# Patient Record
Sex: FEMALE | Race: BLACK OR AFRICAN AMERICAN | Employment: STUDENT | URBAN - METROPOLITAN AREA
[De-identification: names, ages, dates, MRNs, and addresses within clinical notes are randomized per-mention and may not be internally consistent; named-entity substitution may affect disease eponyms.]

---

## 2022-05-23 ENCOUNTER — HOSPITAL ENCOUNTER (OUTPATIENT)
Dept: PHYSICAL THERAPY | Age: 22
Discharge: HOME | End: 2022-05-23
Payer: COMMERCIAL

## 2022-05-23 PROCEDURE — 97161 PT EVAL LOW COMPLEX 20 MIN: CPT | Performed by: PHYSICAL THERAPIST

## 2022-05-23 PROCEDURE — 97110 THERAPEUTIC EXERCISES: CPT | Performed by: PHYSICAL THERAPIST

## 2022-05-23 NOTE — PROGRESS NOTES
[address] [address] [address] [facility] [address] [facility], [address] [place] [address]  Ph: [phone]    Fax: [phone]    Initial Evaluation/Plan of Care/Statement of Necessity for Physical Therapy Services     Patient name: Krzysztof [place]    Date/Start of Care:2022  : 2000  [x]  Patient  Verified  Provider#: [id number]          Referral source: Kevin Gao MD    Return visit to MD: Yumi 15     Medical/Treatment Diagnosis: Pain in right knee [M25.561]  Sprain of anterior cruciate ligament of right knee, initial encounter [S83.437A]  Bucket-handle tear of lateral meniscus, current injury, right knee, initial encounter Amaury Cruz    Payor: BLUE CROSS / Plan: Gianfranco Rios [id number] PPO / Product Type: PPO /       Prior Hospitalization: see medical history     Comorbidities: none  Prior Level of Function: active and I,  Member of color guard for [employer]   Medications: Verified on Patient Summary List          Patient / Family readiness to learn indicated by: asking questions  Persons(s) to be included in education: patient (P) and family support person (FSP);list parent  Barriers to Learning/Limitations: None  Patient Self Reported Health Status: excellent  Rehabilitation Potential: excellent  Previous Treatment/Compliance: 1 session with [facility] for HEP pre-op  PMHx/Surgical Hx: no previous R LE injuries  Work Hx: chantal at [employer]:  Now home for summer, 2 story home   Barriers to progress: none  Motivation: excellent  Substance use: none reported  Cognition: A & O x  3  Onset Date: injury 2022, surgery 2022    SUBJECTIVE  Mechanism of Injury/History of Complaint: Patient is a 25year old female who injured her R knee 2022 while dancing in color guard training session at [employer]. Imaging confirmed R ACL and bucket-handle meniscal tear.   She underwent R meniscal repair and ACL reconstruction with quad tendon autograft on 5/5/2022 at [facility]. She reports compliance with HEP provided by MD, which includes heelslides, quad sets, hip abd SLR, gastroc-soleus towel stretch in long sit and is performing 2/day. Vandana Gray reports some difficulty sleeping, mostly due to the bulkiness of the brace. She is icing her knee several times per day. She takes aspirin per MD to prevent blood clots but no other medications. Her goal is to return to dancing. Area of pain: R anterior knee    Pain Descriptors:  Intermittent, dull ache   Pain Level (0-10 scale)  At rest: 2/10 With activity: 5-6/10   Worst: 7/10   Least: 0/10  Things that worsen pain: movement   Things that ease pain: ice and elevation  Objective/Functional Measures including ROM/MMT:   Physical Findings   Ortho:   Posture:  Sl. Forward head and rounded shoulders  Gait and Functional Mobility:  Amb with B axillary crutches NWB RLE with brace locked in extension. Noted full elbow extension with crutches in current position, so hand  was raised to provide slight elbow flexion with improved axillary comfort reported. Sit to stand NWB RLE I with UE A.  Bed mobility I.  WBS:  As per protocol, NWB until 3 weeks  Swelling: +3 cm measured at mid-patella, +2 cm measured at tib tub  Gross findings:  R quad atrophy apparent and measurable, -1.5 cm R measured 5 cm proximal to superior pole of patella; steri-strips intact without drainage  Specific joints: *normal values in ()  KNEE        AROM          PROM                       MMT   R L R L R L   Extension (0)             0  NT    Flexion (145) Supine 75      110 Prone 85 Prone 120 NT    Patellar Mobility:  No c/o pain all directions  Additional comments: RLE MMT deferred due to post-op status; LLE MMT WFL; demonstrates upper abdominal strength 5/5; decreased R VMO contraction visible with quad sets    Ampac Score:   54.86%  ASSESSMENT/Changes in Function: Vandana Gray is a 29-year-old female 2 1/2 weeks s/p R knee meniscal repair and ACL repair with patellar tendon autograft. She presents with swelling, decreased ROM, decreased strength, decreased balance, and gait deficits which are limiting her functional mobility. She will benefit from skilled PT to address her impairments (as per protocol from PEDRITO MORGAN MD) and improve her functional mobility. Problem List/Impairments: pain affecting function, decrease ROM, decrease strength, edema affecting function, impaired gait/ balance, decrease ADL/ functional abilitiies and decrease flexibility/ joint mobility  Treatment Plan may include any combination of the following: Therapeutic exercise, Therapeutic activities, Neuromuscular re-education, Physical agent/modality, Gait/balance training, Manual therapy, Patient education and Stair training  Patient/ Caregiver education and instruction: brace/ splint application, exercises and other (patient instructed to continue wearing brace locked in extension, NWB and sleeping in brace until next visit when she is at 3 week krzysztof and can be progressed per protocol. Frequency / Duration: Patient to be seen 2-3 times per week for 24-36 treatments. Certification Period: 5/23/2022-8/23/2022  Patient Goal (s): return to same activity level    [] Met [] Not met [] Partially met   Short Term Goals: To be accomplished in 12-18 treatments. 1.  Patient will demonstrate AROM R knee 0-90* without increased pain. 2.  Patient will report daily compliance of progressive HEP to supplement therapy. 3.  Patient will ambulate with proper heel - toe mechanics FWB R knee. Long Term Goals: To be accomplished in 24-36 treatments. 1. Patient will demonstrate AROM R knee = L.  2.  Patient will ambulate up/down stairs without HR with smooth, reciprocal pattern and good eccentric control. TODAY'S TREATMENT: EVALUATION completed.     Visit #: 1/24-36  In time:1:00   Out time:2:05  Total Treatment Time (min): 65   Total Timed Codes (min): 10    Pain Level (0-10 scale) pre treatment: 2-3/10   Pain Level (0-10 scale) post treatment: 2-3/10  Modality rationale: decrease edema, decrease inflammation and decrease pain to improve the patients ability to complete functional mobility with less pain. Min Type Additional Details    [] Estim: []UnAtt   []Att       []TENS instruct                  []IFC  []Premod   []NMES []w/US   []w/ice   []w/heat  Position:                                     Location:   10 [x]  Ice     []  Heat  []  Ice massage Position: long sit  Location: R knee    []  Traction: [] Cervical       []Lumbar                       []Intermittent   []Continuous                     Lbs:  []W/heat               []W/heat and Estim    []  Ultrasound: []Continuous   [] Pulsed at:                           []1MHz   []3MHz Location:  W/cm2:    [x] Skin assessment post-treatment:  [x]intact        []redness- no adverse reaction     []redness - adverse reaction:   10 min Therapeutic Exercise:  [x] See flow sheet :   Rationale: increase ROM and increase strength to improve the patients ability to improve functional mobility. With   [x] TE   [] TA   [] Neuro   [] SC   [] other: Patient Education: [x] Review HEP    [x] Progressed/Changed HEP based on:  Protocol   [] positioning   [] body mechanics   [] transfers   [x] heat/ice application    [] other: continue with brace locked in extension, NWB until next f/u appointment       [x]  Plan of care has been reviewed with PTA. The Plan of Care is based on information from the initial evaluation. Eileen Garcia, PT 5/23/2022   ________________________________________________________________________    I certify that the above Therapy Services are being furnished while the patient is under my care. I agree with the treatment plan and certify that this therapy is necessary.     Physician's Signature:_________________________________________________  Date:____________Time: ____________     David Williamson MD

## 2022-05-26 ENCOUNTER — HOSPITAL ENCOUNTER (OUTPATIENT)
Dept: PHYSICAL THERAPY | Age: 22
Discharge: HOME | End: 2022-05-26
Payer: COMMERCIAL

## 2022-05-26 PROCEDURE — 97116 GAIT TRAINING THERAPY: CPT

## 2022-05-26 PROCEDURE — 97110 THERAPEUTIC EXERCISES: CPT

## 2022-05-26 NOTE — PROGRESS NOTES
PT DAILY TREATMENT NOTE  [facility]     Patient Name: Dyllan Ralph  Date:2022  : 2000  [x]  Patient  Verified  Payor: BLUE SHIRA / Plan: Gianfranco Rios [id number] PPO / Product Type: PPO /    Treatment Area: Pain in right knee [M25.561]  Sprain of anterior cruciate ligament of right knee, initial encounter [P42.439W]       Next MD APPT: 6/15/22  In time: 1:05pm  Out time: 1:45pm  Total Treatment Time (min): 40  Visit #: 2    SUBJECTIVE  Pain Level (0-10 scale) pre treatment: 0      Pain Level (0-10 scale) post treatment: 0    Any medication changes, allergies to medications, adverse drug reactions, diagnosis change, or new procedure performed?:   [x] No    [] Yes (see summary sheet for update)    Subjective functional status/changes:   [] No changes reported  Pt states she is not having any pain or difficulty at this time. She is still wearing the brace at all times and NWB with the crutches. She is sleeping well, performing exercises as directed and icing the knee 2-3x/day. OBJECTIVE    30 min Therapeutic Exercise:  [x] See flow sheet :   Rationale: increase ROM and increase strength to improve the patients ability to move around without L knee pain     10 min Gait Training:  _100__ feet with __crutches_ device on level surfaces with _supervision__ level of assist.  Practiced TTWB with brace unlocked to 90 deg. Adjustment made to height of crutches and brace was adjusted. Rationale: increase ROM, increase strength, improve coordination and improve balance  to improve the patients ability to walk safely     With   [x] TE   [] TA   [] Neuro   [] SC   [] other: Patient Education: [x] Review HEP    [] Progressed/Changed HEP based on:   [] positioning   [] body mechanics   [] transfers   [x] Use of heat/ice     [] other:         Other Objective/Functional Measures: Initiated exercises in clinic and reviewed HEP. Active knee flexion 82 deg.      ASSESSMENT/Changes in Function:   Pt responded well to tx with no pain pre or post session. Pt was able to perform exercises with minimal cues. She was able to ambulate with brace unlocked and use of cruz crutches maintaining WB restrictions. Good quad contraction noted with quad sets. Good patella mobility. Patient will continue to benefit from skilled PT services to modify and progress therapeutic interventions, address functional mobility deficits, address ROM deficits, address strength deficits, analyze and address soft tissue restrictions and analyze and cue movement patterns to attain remaining goals. GOALS/Progress towards goals:  Patient Goal (s): return to same activity level    []? Met []? Not met []? Partially met     Short Term Goals: To be accomplished in 12-18 treatments. 1.  Patient will demonstrate AROM R knee 0-90* without increased pain. 2.  Patient will report daily compliance of progressive HEP to supplement therapy. 3.  Patient will ambulate with proper heel - toe mechanics FWB R knee. Long Term Goals: To be accomplished in 24-36 treatments. 1. Patient will demonstrate AROM R knee = L.  2.  Patient will ambulate up/down stairs without HR with smooth, reciprocal pattern and good eccentric control.     PLAN  [x]  Continue plan of care  [x]  Upgrade activities as tolerated      [x]  Update interventions per flow sheet       []  Discharge due to:  []  Other:    Ildefonso [facility], PT, DPT 5/26/2022

## 2022-06-01 ENCOUNTER — HOSPITAL ENCOUNTER (OUTPATIENT)
Dept: PHYSICAL THERAPY | Age: 22
Discharge: HOME | End: 2022-06-01
Payer: COMMERCIAL

## 2022-06-01 PROCEDURE — 97110 THERAPEUTIC EXERCISES: CPT

## 2022-06-01 NOTE — PROGRESS NOTES
PT DAILY TREATMENT NOTE  [facility]     Patient Name: Rodolfo Camejo  Date:2022  : 2000  [x]  Patient  Verified  Payor: BLUE CROSS / Plan: [facility] PPO / Product Type: PPO /    Treatment Area: Pain in right knee [M25.561]  Sprain of anterior cruciate ligament of right knee, initial encounter [S83.125A]  Bucket-handle tear of lateral meniscus, current injury, right knee, initial encounter [H69.706S]       Next MD APPT: 6/15/22  In time: 4:35pm  Out time: 5:10pm  Total Treatment Time (min): 35  Visit #: 3    SUBJECTIVE  Pain Level (0-10 scale) pre treatment: 0      Pain Level (0-10 scale) post treatment: 0    Any medication changes, allergies to medications, adverse drug reactions, diagnosis change, or new procedure performed?:   [x] No    [] Yes (see summary sheet for update)    Subjective functional status/changes:   [x] No changes reported   No new complaints. Pt still reporting no pain. Doing well walking with WB restrictions and feels comfortable sleeping without the brace. Feels like the swelling has gone down and is still icing daily. OBJECTIVE    35 min Therapeutic Exercise:  [x] See flow sheet :   Rationale: increase ROM and increase strength to improve the patients ability to move around without L knee pain      min Gait Training:  _100__ feet with __crutches_ device on level surfaces with _supervision__ level of assist.  Practiced TTWB with brace unlocked to 90 deg. Adjustment made to height of crutches and brace was adjusted. Rationale: increase ROM, increase strength, improve coordination and improve balance  to improve the patients ability to walk safely     With   [x] TE   [] TA   [] Neuro   [] SC   [] other: Patient Education: [x] Review HEP    [] Progressed/Changed HEP based on:   [] positioning   [] body mechanics   [] transfers   [x] Use of heat/ice     [] other:         Other Objective/Functional Measures: continued per protocol.    Knee AROM 3-99 deg without pain   Performed 4 way SLR without brace this session     ASSESSMENT/Changes in Function:   Pt was able to perform SLR without extensor lag or pain. Meeting ROM goals for time frame at this time. Pt is progressing well at this time with no complaints. Patient will continue to benefit from skilled PT services to modify and progress therapeutic interventions, address functional mobility deficits, address ROM deficits, address strength deficits, analyze and address soft tissue restrictions and analyze and cue movement patterns to attain remaining goals. GOALS/Progress towards goals:  Patient Goal (s): return to same activity level    []? Met []? Not met []? Partially met     Short Term Goals: To be accomplished in 12-18 treatments. 1.  Patient will demonstrate AROM R knee 0-90* without increased pain. [x] Met [] Not met [] Partially met 6/1  2. Patient will report daily compliance of progressive HEP to supplement therapy. [x] Met [] Not met [] Partially met  6/1  3. Patient will ambulate with proper heel - toe mechanics FWB R knee. Long Term Goals: To be accomplished in 24-36 treatments. 1. Patient will demonstrate AROM R knee = L.  2.  Patient will ambulate up/down stairs without HR with smooth, reciprocal pattern and good eccentric control.     PLAN  [x]  Continue plan of care  [x]  Upgrade activities as tolerated      [x]  Update interventions per flow sheet       []  Discharge due to:  []  Other:    Mykel Leahy, PT, DPT 6/1/2022

## 2022-06-03 ENCOUNTER — HOSPITAL ENCOUNTER (OUTPATIENT)
Dept: PHYSICAL THERAPY | Age: 22
Discharge: HOME | End: 2022-06-03
Payer: COMMERCIAL

## 2022-06-03 PROCEDURE — 97110 THERAPEUTIC EXERCISES: CPT

## 2022-06-03 NOTE — PROGRESS NOTES
PT DAILY TREATMENT NOTE  [facility]     Patient Name: Bryce Phillips  Date:6/3/2022  : 2000  [x]  Patient  Verified  Payor: BLUE CROSS / Plan: [facility] PPO / Product Type: PPO /    Treatment Area: Pain in right knee [M25.561]  Sprain of anterior cruciate ligament of right knee, initial encounter [S83.511A]  Bucket-handle tear of lateral meniscus, current injury, right knee, initial encounter [Q36.816E]       Next MD APPT: 6/15/22  In time: 11:30 am  Out time: 12:08pm  Total Treatment Time (min): 38  Visit #: 4    SUBJECTIVE  Pain Level (0-10 scale) pre treatment: 0/10      Pain Level (0-10 scale) post treatment: 0/10    Any medication changes, allergies to medications, adverse drug reactions, diagnosis change, or new procedure performed?:   [x] No    [] Yes (see summary sheet for update)    Subjective functional status/changes:   [x] No changes reported   Pt reports no changes since last visit. Sleeping is going well in addition to TTWB. Steri-strips have started to come off. OBJECTIVE    38 min Therapeutic Exercise:  [x] See flow sheet :   Rationale: increase ROM and increase strength to improve the patients ability to move around without L knee pain      min Gait Training:  _100__ feet with __crutches_ device on level surfaces with _supervision__ level of assist.  Practiced TTWB with brace unlocked to 90 deg. Adjustment made to height of crutches and brace was adjusted. Rationale: increase ROM, increase strength, improve coordination and improve balance  to improve the patients ability to walk safely     With   [x] TE   [] TA   [] Neuro   [] SC   [] other: Patient Education: [x] Review HEP    [] Progressed/Changed HEP based on:   [] positioning   [] body mechanics   [] transfers   [x] Use of heat/ice     [x] other: Scar tissue massage         Other Objective/Functional Measures: continued per protocol.    AROM Knee flexion to 100 degrees on last heel slide   Added in scar tissue massage to exposed scars  Added in 4 way ankle with TB     ASSESSMENT/Changes in Function:   Pt is progressing well with protocol exercises. Next session will progress into second phase of strengthening exercises. Added in 4 way ankle to keep foot extrinsic muscles strong and ready for weight bearing in next couple weeks. Pt is very compliant with protocol exercises at home so working to add newer exercises in that are appropriate with protocol in clinic. Potential to add in VMO focused exercises such as SLR with ER. Can add in 3 way side lying straight leg raise next session. Pt's scars appear to be healing well and responded well to scar tissue massage. Pt instructed to start moisturizing exposed scars and doing at home tissue massage. Patient will continue to benefit from skilled PT services to modify and progress therapeutic interventions, address functional mobility deficits, address ROM deficits, address strength deficits, analyze and address soft tissue restrictions and analyze and cue movement patterns to attain remaining goals. GOALS/Progress towards goals:  Patient Goal (s): return to same activity level    []? Met []? Not met []? Partially met     Short Term Goals: To be accomplished in 12-18 treatments. 1.  Patient will demonstrate AROM R knee 0-90* without increased pain. [x] Met [] Not met [] Partially met 6/1  2. Patient will report daily compliance of progressive HEP to supplement therapy. [x] Met [] Not met [] Partially met  6/1  3. Patient will ambulate with proper heel - toe mechanics FWB R knee. Long Term Goals: To be accomplished in 24-36 treatments. 1. Patient will demonstrate AROM R knee = L.  2.  Patient will ambulate up/down stairs without HR with smooth, reciprocal pattern and good eccentric control. PLAN  [x]  Continue plan of care  [x]  Upgrade activities as tolerated      [x]  Update interventions per flow sheet       []  Discharge due to:  [x]  Other:  Add in VMO and progress glute strengthening exercises appropriate with protocol      Patient was informed and agreed to allow student to observe and participate in medical care. Patient was seen by student and licensed therapist who is in agreement with the above documentation.      Lakshmi Steen, SPT 6/3/2022

## 2022-06-07 ENCOUNTER — HOSPITAL ENCOUNTER (OUTPATIENT)
Dept: PHYSICAL THERAPY | Age: 22
Discharge: HOME | End: 2022-06-07
Payer: COMMERCIAL

## 2022-06-07 PROCEDURE — 97110 THERAPEUTIC EXERCISES: CPT

## 2022-06-07 NOTE — PROGRESS NOTES
PT DAILY TREATMENT NOTE  [facility]     Patient Name: Jorge Castellanos  ALUK:  : 2000  [x]  Patient  Verified  Payor: BLUE CROSS / Plan: [facility] PPO / Product Type: PPO /    Treatment Area: Pain in right knee [M25.561]  Sprain of anterior cruciate ligament of right knee, initial encounter [S83.511A]  Bucket-handle tear of lateral meniscus, current injury, right knee, initial encounter [G08.937C]       Next MD APPT: 6/15/22  In time: 1057 am  Out time: 1135pm  Total Treatment Time (min): 38  Visit #: 5    SUBJECTIVE  Pain Level (0-10 scale) pre treatment: 0/10      Pain Level (0-10 scale) post treatment: 0/10    Any medication changes, allergies to medications, adverse drug reactions, diagnosis change, or new procedure performed?:   [x] No    [] Yes (see summary sheet for update)    Subjective functional status/changes:   [x] No changes reported   Pt reports no new changes, no complaints in pain and tolerated WB status well. States she has been completing HEP. Pt asking when she will be able to drive. OBJECTIVE    38 min Therapeutic Exercise:  [x] See flow sheet :   Rationale: increase ROM and increase strength to improve the patients ability to move around without L knee pain      min Gait Training:  _100__ feet with __crutches_ device on level surfaces with _supervision__ level of assist.  Practiced TTWB with brace unlocked to 90 deg. Adjustment made to height of crutches and brace was adjusted. Rationale: increase ROM, increase strength, improve coordination and improve balance  to improve the patients ability to walk safely     With   [x] TE   [] TA   [] Neuro   [] SC   [] other: Patient Education: [x] Review HEP    [] Progressed/Changed HEP based on:   [] positioning   [] body mechanics   [] transfers   [x] Use of heat/ice     [x] other: reviewed Scar tissue massage and patellar mob        Other Objective/Functional Measures: continued per protocol.    AROM Knee flexion to 105 degrees on last heel slide     ASSESSMENT/Changes in Function:   Pt tolerated session well. All exercises completed without pain. Pt able to complete SLR with only slight quad/extemsion lag, improved with cues for quad engagement. Added VMO SLR w/ good carryover. Added sidelying clamshell, cues for proper hip alignment with clamshell and SL hip abd, with good carryover. Pt AROM 2-105 after ROM exercises. Patient will continue to benefit from skilled PT services to modify and progress therapeutic interventions, address functional mobility deficits, address ROM deficits, address strength deficits, analyze and address soft tissue restrictions and analyze and cue movement patterns to attain remaining goals. GOALS/Progress towards goals:  Patient Goal (s): return to same activity level    []? Met []? Not met []? Partially met     Short Term Goals: To be accomplished in 12-18 treatments. 1.  Patient will demonstrate AROM R knee 0-90* without increased pain. [x] Met [] Not met [] Partially met 6/1  2. Patient will report daily compliance of progressive HEP to supplement therapy. [x] Met [] Not met [] Partially met  6/1  3. Patient will ambulate with proper heel - toe mechanics FWB R knee. Long Term Goals: To be accomplished in 24-36 treatments. 1. Patient will demonstrate AROM R knee = L.  2.  Patient will ambulate up/down stairs without HR with smooth, reciprocal pattern and good eccentric control. PLAN  [x]  Continue plan of care  [x]  Upgrade activities as tolerated      [x]  Update interventions per flow sheet       []  Discharge due to:  []  Other:     Patient was informed and agreed to allow student to observe and participate in medical care. Patient was seen by student and licensed therapist who is in agreement with the above documentation.      Yosi Escoto, PTA 6/7/2022

## 2022-06-10 ENCOUNTER — HOSPITAL ENCOUNTER (OUTPATIENT)
Dept: PHYSICAL THERAPY | Age: 22
Discharge: HOME | End: 2022-06-10
Payer: COMMERCIAL

## 2022-06-10 PROCEDURE — 97110 THERAPEUTIC EXERCISES: CPT

## 2022-06-10 NOTE — PROGRESS NOTES
PT DAILY TREATMENT NOTE  [facility]     Patient Name: Beau Fletcher  Date:6/10/2022  : 2000  [x]  Patient  Verified  Payor: BLUE CROSS / Plan: Gianfranco Rios [id number] PPO / Product Type: PPO /    Treatment Area: Pain in right knee [M25.561]  Sprain of anterior cruciate ligament of right knee, initial encounter [S83.511A]  Bucket-handle tear of lateral meniscus, current injury, right knee, initial encounter [Z66.150S]       Next MD APPT: 6/15/22  In time: 10:50 am  Out time: 11:41pm  Total Treatment Time (min): 50min  Visit #:     SUBJECTIVE  Pain Level (0-10 scale) pre treatment: 0/10  Pain Level (0-10 scale) post treatment: 0/10    Any medication changes, allergies to medications, adverse drug reactions, diagnosis change, or new procedure performed?:   [x] No    [] Yes (see summary sheet for update)    Subjective functional status/changes:   [x] No changes reported   Pt reports no pain and is seeing MD next week 6/15/22.       OBJECTIVE      Modality rationale: decrease edema, decrease inflammation and decrease pain to improve the patients ability to ambulate without knee pain   Min Type Additional Details       [] Estim: []Att   []Unatt    []TENS instruct                  []IFC  []Premod   []NMES                     []Other:  []w/US   []w/ice   []w/heat  Position:  Location:       []  Traction: [] Cervical       []Lumbar                       [] Prone          []Supine                       []Intermittent   []Continuous Lbs:  [] before manual  [] after manual  []w/heat    []  Ultrasound: []Continuous   [] Pulsed                       at: []1MHz   []3MHz Location:  W/cm2:    [] Paraffin         Location:   []w/heat   10 [x]  Ice     []  Heat  []  Ice massage Position:supine  Location:R knee    []  Laser  []  Other: Position:  Location:      []  Vasopneumatic Device Pressure:       [] lo [] med [] hi   Temperature:      [x] Skin assessment post-treatment:  [x]intact []redness- no adverse reaction    []redness - adverse reaction:       40 min Therapeutic Exercise:  [x] See flow sheet :   Rationale: increase ROM and increase strength to improve the patients ability to move around without L knee pain      min Gait Training:  _100__ feet with __crutches_ device on level surfaces with _supervision__ level of assist.  Practiced TTWB with brace unlocked to 90 deg. Adjustment made to height of crutches and brace was adjusted. Rationale: increase ROM, increase strength, improve coordination and improve balance  to improve the patients ability to walk safely     With   [x] TE   [] TA   [] Neuro   [] SC   [] other: Patient Education: [x] Review HEP    [] Progressed/Changed HEP based on:   [] positioning   [] body mechanics   [] transfers   [x] Use of heat/ice     [x] other: 5 weeks 6/10/22        Other Objective/Functional Measures: continued per protocol. AROM Knee flexion to 108 degrees    Added fitter, heel raises and mini squat per protocol    ASSESSMENT/Changes in Function:   Patient was able to tolerate new exercises without any pain. Increased exercises per PT and protocol. Patient needed cues to slow down and work on eccentric lowering. Discussed elevating R LE more at home with cp for swelling. Patient had increase swelling in  R LE today. Reporting no pain pre or post treatment session. Patient will continue to benefit from skilled PT services to modify and progress therapeutic interventions, address functional mobility deficits, address ROM deficits, address strength deficits, analyze and address soft tissue restrictions and analyze and cue movement patterns to attain remaining goals. GOALS/Progress towards goals:  Patient Goal (s): return to same activity level    []? Met []? Not met []? Partially met     Short Term Goals: To be accomplished in 12-18 treatments. 1.  Patient will demonstrate AROM R knee 0-90* without increased pain. [x] Met [] Not met [] Partially met 6/1  2. Patient will report daily compliance of progressive HEP to supplement therapy. [x] Met [] Not met [] Partially met  6/1  3. Patient will ambulate with proper heel - toe mechanics FWB R knee. Long Term Goals: To be accomplished in 24-36 treatments. 1. Patient will demonstrate AROM R knee = L.  2.  Patient will ambulate up/down stairs without HR with smooth, reciprocal pattern and good eccentric control. Treatment Plan may include any combination of the following: Therapeutic exercise, Therapeutic activities, Neuromuscular re-education, Physical agent/modality, Gait/balance training, Manual therapy, Patient education and Stair training    Frequency / Duration: Patient to be seen 2-3 times per week for 24-36 treatments.     Certification Period: 5/23/2022-8/23/2022    PLAN  [x]  Continue plan of care  [x]  Upgrade activities as tolerated      [x]  Update interventions per flow sheet       []  Discharge due to:  []  Other:      Lindsey Jensen, PTA 6/10/2022

## 2022-06-14 ENCOUNTER — HOSPITAL ENCOUNTER (OUTPATIENT)
Dept: PHYSICAL THERAPY | Age: 22
Discharge: HOME | End: 2022-06-14
Payer: COMMERCIAL

## 2022-06-14 PROCEDURE — 97110 THERAPEUTIC EXERCISES: CPT

## 2022-06-14 NOTE — PROGRESS NOTES
[address] [facility], [address] [place] [address]  Ph: [phone]    Fax: [phone]  Therapy Progress Report  Name: Malena Daniels  : 2000   MD: Olvin Cosby MD     Medical Diagnosis: Pain in right knee [M25.561]  Sprain of anterior cruciate ligament of right knee, initial encounter [S83.306A]  Bucket-handle tear of lateral meniscus, current injury, right knee, initial encounter Brent Carrington  Start of Care: 22    Visits from Start of Care: 7   Missed Visits: 0  Certification Period: 22- 22    Frequency/Duration: 2 times a week for 24-36 treatments   Summary of Care/Goals:    ASSESSMENT/Changes in Function:   Pt is progressing well at this time. She has completed 7 sessions and is progressing well per protocol. Good knee flexion ROM at this time, 110 degrees. Able to perform SLR without extensor lag and has been able to initiate closed chain strengthening without pain or difficulty. Continues to ambulate with TTWB and crutches but has initiated heel to toe mechanics with brace and crutches in clinic without difficulty or pain. Will continue to progress per protocol at this time. Patient will continue to benefit from skilled PT services to modify and progress therapeutic interventions, address functional mobility deficits, address ROM deficits, address strength deficits, analyze and address soft tissue restrictions and analyze and cue movement patterns to attain remaining goals. GOALS/Progress towards goals:  Patient Goal (s): return to same activity level    []? ? Met []? ? Not met [x]? ? Partially met  - improving      Short Term Goals: To be accomplished in 12-18 treatments. 1.  Patient will demonstrate AROM R knee 0-90* without increased pain. [x]? Met []? Not met []? Partially met   2.  Patient will report daily compliance of progressive HEP to supplement therapy. [x]? Met []? Not met []?  Partially met 6/1  3.  Patient will ambulate with proper heel - toe mechanics FWB R knee. [] Met [] Not met [] Partially met         Long Term Goals: To be accomplished in 24-36 treatments. 1. Patient will demonstrate AROM R knee = L. [] Met [] Not met [] Partially met    2.  Patient will ambulate up/down stairs without HR with smooth, reciprocal pattern and good eccentric control. [] Met [] Not met [] Partially met     [facility] Score: 59.19%   Recommendations: continue current POC   [x]  Plan of care has been reviewed with PTA. Debbi Caraballo, PT, DPT 6/14/2022     ________________________________________________________________________     Please retain this original for your records.

## 2022-06-14 NOTE — PROGRESS NOTES
PT DAILY TREATMENT NOTE  [facility]     Patient Name: Krzysztof Colorado  Date:2022  : 2000  [x]  Patient  Verified  Payor: BLUE CROSS / Plan: Gianfranco Rios [id number] PPO / Product Type: PPO /    Treatment Area: Pain in right knee [M25.561]  Sprain of anterior cruciate ligament of right knee, initial encounter [S83.511A]  Bucket-handle tear of lateral meniscus, current injury, right knee, initial encounter [J79.182M]         Next MD APPT: 6/15/22  In time: 10:52am  Out time: 11:30am  Total Treatment Time (min): 38  Visit #:     SUBJECTIVE  Pain Level (0-10 scale) pre treatment: 0/10  Pain Level (0-10 scale) post treatment: 0/10    Any medication changes, allergies to medications, adverse drug reactions, diagnosis change, or new procedure performed?:   [x] No    [] Yes (see summary sheet for update)    Subjective functional status/changes:   [x] No changes reported   No new complaints. Doing well with no pain in the R knee.          OBJECTIVE      Modality rationale: decrease edema, decrease inflammation and decrease pain to improve the patients ability to ambulate without knee pain   Min Type Additional Details       [] Estim: []Att   []Unatt    []TENS instruct                  []IFC  []Premod   []NMES                     []Other:  []w/US   []w/ice   []w/heat  Position:  Location:       []  Traction: [] Cervical       []Lumbar                       [] Prone          []Supine                       []Intermittent   []Continuous Lbs:  [] before manual  [] after manual  []w/heat    []  Ultrasound: []Continuous   [] Pulsed                       at: []1MHz   []3MHz Location:  W/cm2:    [] Paraffin         Location:   []w/heat    [x]  Ice     []  Heat  []  Ice massage Position:supine  Location:R knee    []  Laser  []  Other: Position:  Location:      []  Vasopneumatic Device Pressure:       [] lo [] med [] hi   Temperature:      [x] Skin assessment post-treatment:  [x]intact []redness- no adverse reaction    []redness - adverse reaction:       33 min Therapeutic Exercise:  [x] See flow sheet :   Rationale: increase ROM and increase strength to improve the patients ability to move around without L knee pain     5 min Gait Training:  Began practicing heel to toe walking with the crutches as pt will be 6 weeks on Thursday and continues to be pain free with good mechanics. Rationale: increase ROM, increase strength, improve coordination and improve balance  to improve the patients ability to walk safely     With   [x] TE   [] TA   [] Neuro   [] SC   [] other: Patient Education: [x] Review HEP    [] Progressed/Changed HEP based on:   [] positioning   [] body mechanics   [] transfers   [x] Use of heat/ice     [x] other: 5 weeks 6/10/22        Other Objective/Functional Measures: continued per protocol. AROM Knee flexion to 110 degrees  Added standing resisted terminal knee ext and theraband ankle. increased reps with all previous exercises. ASSESSMENT/Changes in Function:   Pt is progressing well at this time. She has completed 7 sessions and is progressing per protocol. Good knee flexion ROM at this time. Able to perform SLR without extensor lag and has been able to initiate closed chain strengthening without pain or difficulty. Will continue to progress per protocol at this time. Patient will continue to benefit from skilled PT services to modify and progress therapeutic interventions, address functional mobility deficits, address ROM deficits, address strength deficits, analyze and address soft tissue restrictions and analyze and cue movement patterns to attain remaining goals. GOALS/Progress towards goals:  Patient Goal (s): return to same activity level    []? Met []? Not met []? Partially met     Short Term Goals: To be accomplished in 12-18 treatments. 1.  Patient will demonstrate AROM R knee 0-90* without increased pain. [x] Met [] Not met [] Partially met 6/1  2.   Patient will report daily compliance of progressive HEP to supplement therapy. [x] Met [] Not met [] Partially met  6/1  3. Patient will ambulate with proper heel - toe mechanics FWB R knee. Long Term Goals: To be accomplished in 24-36 treatments. 1. Patient will demonstrate AROM R knee = L.  2.  Patient will ambulate up/down stairs without HR with smooth, reciprocal pattern and good eccentric control. Treatment Plan may include any combination of the following: Therapeutic exercise, Therapeutic activities, Neuromuscular re-education, Physical agent/modality, Gait/balance training, Manual therapy, Patient education and Stair training    Frequency / Duration: Patient to be seen 2-3 times per week for 24-36 treatments.     Certification Period: 5/23/2022-8/23/2022    PLAN  [x]  Continue plan of care  [x]  Upgrade activities as tolerated      [x]  Update interventions per flow sheet       []  Discharge due to:  [x]  Other: 6 weeks 6/16      Debbi Casanova Current, PT, DPT 6/14/2022

## 2022-06-17 ENCOUNTER — HOSPITAL ENCOUNTER (OUTPATIENT)
Dept: PHYSICAL THERAPY | Age: 22
Discharge: HOME | End: 2022-06-17
Payer: COMMERCIAL

## 2022-06-17 PROCEDURE — 97110 THERAPEUTIC EXERCISES: CPT

## 2022-06-17 NOTE — PROGRESS NOTES
PT DAILY TREATMENT NOTE  [facility]     Patient Name: Lesly Chowdary  Date:2022  : 2000  [x]  Patient  Verified  Payor: BLUE CROSS / Plan: [employer]anco [employer]ios [id number] PPO / Product Type: PPO /    Treatment Area: Pain in right knee [M25.561]  Sprain of anterior cruciate ligament of right knee, initial encounter [S83.511A]  Bucket-handle tear of lateral meniscus, current injury, right knee, initial encounter [T46.390K]         Next MD APPT: 6/15/22  In time: 10:50 am  Out time: 11:30 am  Total Treatment Time (min): 40 min  Visit #:     SUBJECTIVE  Pain Level (0-10 scale) pre treatment: 0/10  Pain Level (0-10 scale) post treatment: 0/10    Any medication changes, allergies to medications, adverse drug reactions, diagnosis change, or new procedure performed?:   [x] No    [] Yes (see summary sheet for update)    Subjective functional status/changes:   [x] No changes reported   Patient saw MD and pleased with progress so far. She is still wearing brace but down to 1 crutch. \"He said I can do 1 crutch and then no crutches . \"       OBJECTIVE      Modality rationale: decrease edema, decrease inflammation and decrease pain to improve the patients ability to ambulate without knee pain   Min Type Additional Details       [] Estim: []Att   []Unatt    []TENS instruct                  []IFC  []Premod   []NMES                     []Other:  []w/US   []w/ice   []w/heat  Position:  Location:       []  Traction: [] Cervical       []Lumbar                       [] Prone          []Supine                       []Intermittent   []Continuous Lbs:  [] before manual  [] after manual  []w/heat    []  Ultrasound: []Continuous   [] Pulsed                       at: []1MHz   []3MHz Location:  W/cm2:    [] Paraffin         Location:   []w/heat    []  Ice     []  Heat  []  Ice massage Position:supine  Location:R knee    []  Laser  []  Other: Position:  Location:      []  Vasopneumatic Device Pressure:       [] lo [] med [] hi Temperature:      [] Skin assessment post-treatment:  []intact []redness- no adverse reaction    []redness - adverse reaction:       40 min Therapeutic Exercise:  [x] See flow sheet :   Rationale: increase ROM and increase strength to improve the patients ability to move around without L knee pain      min Gait Training:  Began practicing heel to toe walking with the crutches as pt will be 6 weeks on Thursday and continues to be pain free with good mechanics. Rationale: increase ROM, increase strength, improve coordination and improve balance  to improve the patients ability to walk safely     With   [x] TE   [] TA   [] Neuro   [] SC   [] other: Patient Education: [x] Review HEP    [] Progressed/Changed HEP based on:   [] positioning   [] body mechanics   [] transfers   [x] Use of heat/ice     [x] other: 5 weeks 6/10/22        Other Objective/Functional Measures: continued per protocol. Added nustep, leg press and standing/closed chain exercises    ASSESSMENT/Changes in Function:   Patient came in with 1 crutch following MD appt. She is able to advance to closed chain activities . She tolerated new exercises without any pain or discomfort. She was a little apprehensive at trying new exercises at first.  Will continue to progress per protocol at this time. Patient will continue to benefit from skilled PT services to modify and progress therapeutic interventions, address functional mobility deficits, address ROM deficits, address strength deficits, analyze and address soft tissue restrictions and analyze and cue movement patterns to attain remaining goals. GOALS/Progress towards goals:  Patient Goal (s): return to same activity level    []? Met []? Not met []? Partially met     Short Term Goals: To be accomplished in 12-18 treatments. 1.  Patient will demonstrate AROM R knee 0-90* without increased pain. [x] Met [] Not met [] Partially met 6/1  2.   Patient will report daily compliance of progressive HEP to supplement therapy. [x] Met [] Not met [] Partially met  6/1  3. Patient will ambulate with proper heel - toe mechanics FWB R knee. Long Term Goals: To be accomplished in 24-36 treatments. 1. Patient will demonstrate AROM R knee = L.  2.  Patient will ambulate up/down stairs without HR with smooth, reciprocal pattern and good eccentric control. Treatment Plan may include any combination of the following: Therapeutic exercise, Therapeutic activities, Neuromuscular re-education, Physical agent/modality, Gait/balance training, Manual therapy, Patient education and Stair training    Frequency / Duration: Patient to be seen 2-3 times per week for 24-36 treatments.     Certification Period: 5/23/2022-8/23/2022    PLAN  [x]  Continue plan of care  [x]  Upgrade activities as tolerated      [x]  Update interventions per flow sheet       []  Discharge due to:  [x]  Other: 6 weeks 6/16      Magalys Skinner, PTA 6/17/2022

## 2022-06-21 ENCOUNTER — HOSPITAL ENCOUNTER (OUTPATIENT)
Dept: PHYSICAL THERAPY | Age: 22
Discharge: HOME | End: 2022-06-21
Payer: COMMERCIAL

## 2022-06-21 PROCEDURE — 97110 THERAPEUTIC EXERCISES: CPT

## 2022-06-21 NOTE — PROGRESS NOTES
PT DAILY TREATMENT NOTE  [facility]     Patient Name: Clair Labor  Date:2022  : 2000  [x]  Patient  Verified  Payor: BLUE CROSS / Plan: [facility] PPO / Product Type: PPO /    Treatment Area: Pain in right knee [M25.561]  Sprain of anterior cruciate ligament of right knee, initial encounter [S83.511A]  Bucket-handle tear of lateral meniscus, current injury, right knee, initial encounter [E45.431J]         Next MD APPT: 22  In time: 10:50am  Out time: 11:28am  Total Treatment Time (min):38  Visit #:     SUBJECTIVE  Pain Level (0-10 scale) pre treatment: 0/10  Pain Level (0-10 scale) post treatment: 0/10    Any medication changes, allergies to medications, adverse drug reactions, diagnosis change, or new procedure performed?:   [x] No    [] Yes (see summary sheet for update)    Subjective functional status/changes:   [] No changes reported   Pt states yesterday she started walking around without the crutch. Still using the knee brace. Pt states the more she walked without the crutch the more normal the leg felt. No issues currently. OBJECTIVE    38 min Therapeutic Exercise:  [x] See flow sheet :   Rationale: increase ROM and increase strength to improve the patients ability to move around without L knee pain      min Gait Training:  Began practicing heel to toe walking with the crutches as pt will be 6 weeks on Thursday and continues to be pain free with good mechanics. Rationale: increase ROM, increase strength, improve coordination and improve balance  to improve the patients ability to walk safely     With   [x] TE   [] TA   [] Neuro   [] SC   [] other: Patient Education: [x] Review HEP    [] Progressed/Changed HEP based on:   [] positioning   [] body mechanics   [] transfers   [x] Use of heat/ice     [x] other: 5 weeks 6/10/22        Other Objective/Functional Measures: continued per protocol - added slant board, 3 way SLR standing, and bridges. Increase weight on leg press and had pt perform phase 2 SLRs     ASSESSMENT/Changes in Function:   Pt ambulating with brace only this session, discontinued crutches yesterday. Smooth gait with no signs of instability. Pt is progressing very well at this time. Will continue to progress per protocol at this time. Patient will continue to benefit from skilled PT services to modify and progress therapeutic interventions, address functional mobility deficits, address ROM deficits, address strength deficits, analyze and address soft tissue restrictions and analyze and cue movement patterns to attain remaining goals. GOALS/Progress towards goals:  Patient Goal (s): return to same activity level    []? Met []? Not met []? Partially met     Short Term Goals: To be accomplished in 12-18 treatments. 1.  Patient will demonstrate AROM R knee 0-90* without increased pain. [x] Met [] Not met [] Partially met 6/1  2. Patient will report daily compliance of progressive HEP to supplement therapy. [x] Met [] Not met [] Partially met  6/1  3. Patient will ambulate with proper heel - toe mechanics FWB R knee. .[] Met [] Not met [] Partially met 6/21 FWB with brace     Long Term Goals: To be accomplished in 24-36 treatments. 1. Patient will demonstrate AROM R knee = L. [] Met [] Not met [] Partially met 6/21 progressing   2. Patient will ambulate up/down stairs without HR with smooth, reciprocal pattern and good eccentric control. Treatment Plan may include any combination of the following: Therapeutic exercise, Therapeutic activities, Neuromuscular re-education, Physical agent/modality, Gait/balance training, Manual therapy, Patient education and Stair training    Frequency / Duration: Patient to be seen 2-3 times per week for 24-36 treatments.     Certification Period: 5/23/2022-8/23/2022    PLAN  [x]  Continue plan of care  [x]  Upgrade activities as tolerated      [x]  Update interventions per flow sheet       [] Discharge due to:  [x]  Other: Will be 8 weeks on 6/30/22 - able to discontinue use of brace at that time       [address] JEAN, PT, DPT 6/21/2022

## 2022-06-24 ENCOUNTER — HOSPITAL ENCOUNTER (OUTPATIENT)
Dept: PHYSICAL THERAPY | Age: 22
Discharge: HOME | End: 2022-06-24
Payer: COMMERCIAL

## 2022-06-24 PROCEDURE — 97110 THERAPEUTIC EXERCISES: CPT

## 2022-06-24 NOTE — PROGRESS NOTES
PT DAILY TREATMENT NOTE  [facility]     Patient Name: Brittni Gallegos  Date:2022  : 2000  [x]  Patient  Verified  Payor: [employer]UE [employer] / Plan: [employer] [id number] PPO / Product Type: PPO /    Treatment Area: Pain in right knee [M25.561]  Sprain of anterior cruciate ligament of right knee, initial encounter [S83.511A]  Bucket-handle tear of lateral meniscus, current injury, right knee, initial encounter [L27.539C]         Next MD APPT: 22  In time: 10:47am    Out time: 10:34am  Total Treatment Time (min):47  Visit #:     SUBJECTIVE  Pain Level (0-10 scale) pre treatment: 0/10  Pain Level (0-10 scale) post treatment: 0/10    Any medication changes, allergies to medications, adverse drug reactions, diagnosis change, or new procedure performed?:   [x] No    [] Yes (see summary sheet for update)    Subjective functional status/changes:   [] No changes reported   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 Pt reports max pain 6/10 after working at the summer camp. Pt states she did not have issues sleeping that night and was a little sore the next morning but then it eased up. Discussed signs of overuse. No pain currently. OBJECTIVE    47 min Therapeutic Exercise:  [x] See flow sheet :   Rationale: increase ROM and increase strength to improve the patients ability to move around without L knee pain      min Gait Training:  Began practicing heel to toe walking with the crutches as pt will be 6 weeks on Thursday and continues to be pain free with good mechanics.      Rationale: increase ROM, increase strength, improve coordination and improve balance  to improve the patients ability to walk safely     With   [x] TE   [] TA   [] Neuro   [] SC   [] other: Patient Education: [x] Review HEP    [] Progressed/Changed HEP based on:   [] positioning   [] body mechanics   [] transfers   [x] Use of heat/ice     [x] other: 5 weeks 6/10/22        Other Objective/Functional Measures: added weights to 4 way SLR and used all bands on fitter. Increase time on NUSTEP. ASSESSMENT/Changes in Function:   Pt responded well to tx with no pain throughout session. Was able to perform increase weights/resistance without difficulty. Pt reporting some soreness in the knee following her initial day working at summer camp due to all of the walking but this only lasted a day. Reviewed signs of overuse and pt reported understanding. Will continue to progress per protocol at this time. Patient will continue to benefit from skilled PT services to modify and progress therapeutic interventions, address functional mobility deficits, address ROM deficits, address strength deficits, analyze and address soft tissue restrictions and analyze and cue movement patterns to attain remaining goals. GOALS/Progress towards goals:  Patient Goal (s): return to same activity level    []? Met []? Not met []? Partially met     Short Term Goals: To be accomplished in 12-18 treatments. 1.  Patient will demonstrate AROM R knee 0-90* without increased pain. [x] Met [] Not met [] Partially met 6/1  2. Patient will report daily compliance of progressive HEP to supplement therapy. [x] Met [] Not met [] Partially met  6/1  3. Patient will ambulate with proper heel - toe mechanics FWB R knee. .[] Met [] Not met [] Partially met 6/21 FWB with brace     Long Term Goals: To be accomplished in 24-36 treatments. 1. Patient will demonstrate AROM R knee = L. [] Met [] Not met [] Partially met 6/21 progressing   2. Patient will ambulate up/down stairs without HR with smooth, reciprocal pattern and good eccentric control.     Treatment Plan may include any combination of the following: Therapeutic exercise, Therapeutic activities, Neuromuscular re-education, Physical agent/modality, Gait/balance training, Manual therapy, Patient education and Stair training    Frequency / Duration: Patient to be seen 2-3 times per week for 24-36 treatments.     Certification Period: 5/23/2022-8/23/2022    PLAN  [x]  Continue plan of care  [x]  Upgrade activities as tolerated      [x]  Update interventions per flow sheet       []  Discharge due to:  [x]  Other: Will be 8 weeks on 6/30/22 - able to discontinue use of brace at that time       Debbi Zarco, PT, DPT 6/24/2022

## 2022-06-28 ENCOUNTER — HOSPITAL ENCOUNTER (OUTPATIENT)
Dept: PHYSICAL THERAPY | Age: 22
Discharge: HOME | End: 2022-06-28
Payer: COMMERCIAL

## 2022-06-28 PROCEDURE — 97110 THERAPEUTIC EXERCISES: CPT

## 2022-06-28 NOTE — PROGRESS NOTES
PT DAILY TREATMENT NOTE  [facility]     Patient Name: Jeremias Gutierrez  Date:2022  : 2000  [x]  Patient  Verified  Payor: BLUE CROSS / Plan: [employer] 5747 PPO / Product Type: PPO /    Treatment Area: Pain in right knee [M25.561]  Sprain of anterior cruciate ligament of right knee, initial encounter [S83.511A]  Bucket-handle tear of lateral meniscus, current injury, right knee, initial encounter [C24.476F]         Next MD APPT: 22  In time: 10:53am   Out time: 11:53am  Total Treatment Time (min):60  Visit #:     SUBJECTIVE  Pain Level (0-10 scale) pre treatment: 0/10  Pain Level (0-10 scale) post treatment: 0/10    Any medication changes, allergies to medications, adverse drug reactions, diagnosis change, or new procedure performed?:   [x] No    [] Yes (see summary sheet for update)    Subjective functional status/changes:   [] No changes reported   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 Pt reports max pain 6/10 after working at the summer camp. Pt states she did not have issues sleeping that night and was a little sore the next morning but then it eased up. Discussed signs of overuse. No pain currently.       OBJECTIVE  Modality rationale: decrease pain to improve the patients ability to perform daily activities without R knee pain   Min Type Additional Details       [] Estim: []Att   []Unatt    []TENS instruct                  []IFC  []Premod   []NMES                     []Other:  []w/US   []w/ice   []w/heat  Position:  Location:       []  Traction: [] Cervical       []Lumbar                       [] Prone          []Supine                       []Intermittent   []Continuous Lbs:  [] before manual  [] after manual  []w/heat    []  Ultrasound: []Continuous   [] Pulsed                       at: []1MHz   []3MHz Location:  W/cm2:    [] Paraffin         Location:   []w/heat   10 [x]  Ice     []  Heat  []  Ice massage Position: supine   Location: R knee     []  Laser  []  Other: Position:  Location:      []  Vasopneumatic Device Pressure:       [] lo [] med [] hi   Temperature:      [x] Skin assessment post-treatment:  [x]intact []redness- no adverse reaction    []redness - adverse reaction:       50 min Therapeutic Exercise:  [x] See flow sheet :   Rationale: increase ROM and increase strength to improve the patients ability to move around without L knee pain      min Gait Training:  Began practicing heel to toe walking with the crutches as pt will be 6 weeks on Thursday and continues to be pain free with good mechanics. Rationale: increase ROM, increase strength, improve coordination and improve balance  to improve the patients ability to walk safely     With   [] TE   [] TA   [] Neuro   [] SC   [] other: Patient Education: [] Review HEP    [] Progressed/Changed HEP based on:   [] positioning   [] body mechanics   [] transfers   [] Use of heat/ice     [x] other: walking progression without brace starting on level surface (in home) then progressing to uneven surface/outdoors as tolerated         Other Objective/Functional Measures:   Performed all exercises without brace this session. Added stationary bike. ASSESSMENT/Changes in Function:   Pt was able to perform all exercises and ambulate in gym without knee brace. No gait deviations, instability in knee or pain noted or reported. Pt is progressing well at this time. Will continue to progress per protocol. Patient will continue to benefit from skilled PT services to modify and progress therapeutic interventions, address functional mobility deficits, address ROM deficits, address strength deficits, analyze and address soft tissue restrictions and analyze and cue movement patterns to attain remaining goals. GOALS/Progress towards goals:  Patient Goal (s): return to same activity level    []? Met []? Not met []?  Partially met     Short Term Goals: To be accomplished in 12-18 treatments. 1.  Patient will demonstrate AROM R knee 0-90* without increased pain. [x] Met [] Not met [] Partially met 6/1  2. Patient will report daily compliance of progressive HEP to supplement therapy. [x] Met [] Not met [] Partially met  6/1  3. Patient will ambulate with proper heel - toe mechanics FWB R knee. .[] Met [] Not met [] Partially met 6/21 FWB with brace     Long Term Goals: To be accomplished in 24-36 treatments. 1. Patient will demonstrate AROM R knee = L. [] Met [] Not met [] Partially met 6/21 progressing   2. Patient will ambulate up/down stairs without HR with smooth, reciprocal pattern and good eccentric control. Treatment Plan may include any combination of the following: Therapeutic exercise, Therapeutic activities, Neuromuscular re-education, Physical agent/modality, Gait/balance training, Manual therapy, Patient education and Stair training    Frequency / Duration: Patient to be seen 2-3 times per week for 24-36 treatments.     Certification Period: 5/23/2022-8/23/2022    PLAN  [x]  Continue plan of care  [x]  Upgrade activities as tolerated      [x]  Update interventions per flow sheet       []  Discharge due to:  [x]  Other: Will be 8 weeks on 6/30/22 - able to discontinue use of brace at that time       Debbi Granados, PT, DPT 6/28/2022

## 2022-07-01 ENCOUNTER — HOSPITAL ENCOUNTER (OUTPATIENT)
Dept: PHYSICAL THERAPY | Age: 22
Discharge: HOME | End: 2022-07-01
Payer: COMMERCIAL

## 2022-07-01 PROCEDURE — 97110 THERAPEUTIC EXERCISES: CPT

## 2022-07-01 NOTE — PROGRESS NOTES
PT DAILY TREATMENT NOTE  [facility]     Patient Name: Marisela Bartlett  SKZB:  : 2000  [x]  Patient  Verified  Payor: BLUE CROSS / Plan: [facility] PPO / Product Type: PPO /    Treatment Area: Pain in right knee [M25.561]  Sprain of anterior cruciate ligament of right knee, initial encounter [S83.511A]  Bucket-handle tear of lateral meniscus, current injury, right knee, initial encounter [X78.549D]         Next MD APPT: 22  In time: 10:45am   Out time: 11:36am  Total Treatment Time (min):51 min  Visit #:     SUBJECTIVE  Pain Level (0-10 scale) pre treatment: 0/10  Pain Level (0-10 scale) post treatment: 0/10    Any medication changes, allergies to medications, adverse drug reactions, diagnosis change, or new procedure performed?:   [x] No    [] Yes (see summary sheet for update)    Subjective functional status/changes:   [] No changes reported   Patient came in with no pain. She did not report any soreness and reports ice at the end of session helps. \"I'm doing my regular activities at home with no difficulty. \"      OBJECTIVE  Modality rationale: decrease pain to improve the patients ability to perform daily activities without R knee pain   Min Type Additional Details       [] Estim: []Att   []Unatt    []TENS instruct                  []IFC  []Premod   []NMES                     []Other:  []w/US   []w/ice   []w/heat  Position:  Location:       []  Traction: [] Cervical       []Lumbar                       [] Prone          []Supine                       []Intermittent   []Continuous Lbs:  [] before manual  [] after manual  []w/heat    []  Ultrasound: []Continuous   [] Pulsed                       at: []1MHz   []3MHz Location:  W/cm2:    [] Paraffin         Location:   []w/heat   10 [x]  Ice     []  Heat  []  Ice massage Position: supine   Location: R knee     []  Laser  []  Other: Position:  Location:      []  Vasopneumatic Device Pressure:       [] lo [] med [] hi Temperature:      [x] Skin assessment post-treatment:  [x]intact []redness- no adverse reaction    []redness - adverse reaction:       41 min Therapeutic Exercise:  [x] See flow sheet :   Rationale: increase ROM and increase strength to improve the patients ability to move around without L knee pain      min Gait Training:  Began practicing heel to toe walking with the crutches as pt will be 6 weeks on Thursday and continues to be pain free with good mechanics. Rationale: increase ROM, increase strength, improve coordination and improve balance  to improve the patients ability to walk safely     With   [] TE   [] TA   [] Neuro   [] SC   [] other: Patient Education: [] Review HEP    [] Progressed/Changed HEP based on:   [] positioning   [] body mechanics   [] transfers   [] Use of heat/ice     [x] other: walking progression without brace starting on level surface (in home) then progressing to uneven surface/outdoors as tolerated         Other Objective/Functional Measures:   Performed all exercises without brace this session. Added mini tramp     ASSESSMENT/Changes in Function:   Pt was able to perform all exercises and ambulate in gym without knee brace. She was able to tolerate new exercise and increase in reps. She did have some fatigue following exercises. No problems with knee instability noted or reported. Will continue to progress per protocol. Patient will continue to benefit from skilled PT services to modify and progress therapeutic interventions, address functional mobility deficits, address ROM deficits, address strength deficits, analyze and address soft tissue restrictions and analyze and cue movement patterns to attain remaining goals. GOALS/Progress towards goals:  Patient Goal (s): return to same activity level    []? Met []? Not met []? Partially met     Short Term Goals: To be accomplished in 12-18 treatments.   1.  Patient will demonstrate AROM R knee 0-90* without increased pain. [x] Met [] Not met [] Partially met 6/1  2. Patient will report daily compliance of progressive HEP to supplement therapy. [x] Met [] Not met [] Partially met  6/1  3. Patient will ambulate with proper heel - toe mechanics FWB R knee. .[] Met [] Not met [] Partially met 6/21 FWB with brace     Long Term Goals: To be accomplished in 24-36 treatments. 1. Patient will demonstrate AROM R knee = L. [] Met [] Not met [] Partially met 6/21 progressing   2. Patient will ambulate up/down stairs without HR with smooth, reciprocal pattern and good eccentric control. Treatment Plan may include any combination of the following: Therapeutic exercise, Therapeutic activities, Neuromuscular re-education, Physical agent/modality, Gait/balance training, Manual therapy, Patient education and Stair training    Frequency / Duration: Patient to be seen 2-3 times per week for 24-36 treatments.     Certification Period: 5/23/2022-8/23/2022    PLAN  [x]  Continue plan of care  [x]  Upgrade activities as tolerated      [x]  Update interventions per flow sheet       []  Discharge due to:  []  Other:       Linden Crigler, PTA 7/1/2022

## 2022-07-05 ENCOUNTER — HOSPITAL ENCOUNTER (OUTPATIENT)
Dept: PHYSICAL THERAPY | Age: 22
Discharge: HOME | End: 2022-07-05
Payer: COMMERCIAL

## 2022-07-05 PROCEDURE — 97110 THERAPEUTIC EXERCISES: CPT

## 2022-07-05 NOTE — PROGRESS NOTES
PT DAILY TREATMENT NOTE  [facility]     Patient Name: Radha Washington  Date:2022  : 2000  [x]  Patient  Verified  Payor: BLUE CROSS / Plan: [facility] PPO / Product Type: PPO /    Treatment Area: Pain in right knee [M25.561]  Sprain of anterior cruciate ligament of right knee, initial encounter [S83.511A]  Bucket-handle tear of lateral meniscus, current injury, right knee, initial encounter [R93.260H]         Next MD APPT: 22  In time: 4pm   Out time: 445pm  Total Treatment Time (min): 45 min  Visit #:     SUBJECTIVE  Pain Level (0-10 scale) pre treatment: 0/10  Pain Level (0-10 scale) post treatment: 0/10    Any medication changes, allergies to medications, adverse drug reactions, diagnosis change, or new procedure performed?:   [x] No    [] Yes (see summary sheet for update)    Subjective functional status/changes:   [] No changes reported   Pt states that everything has been going good. She reports that she feels like her balance is still not great.     OBJECTIVE  Modality rationale: decrease pain to improve the patients ability to perform daily activities without R knee pain   Min Type Additional Details       [] Estim: []Att   []Unatt    []TENS instruct                  []IFC  []Premod   []NMES                     []Other:  []w/US   []w/ice   []w/heat  Position:  Location:       []  Traction: [] Cervical       []Lumbar                       [] Prone          []Supine                       []Intermittent   []Continuous Lbs:  [] before manual  [] after manual  []w/heat    []  Ultrasound: []Continuous   [] Pulsed                       at: []1MHz   []3MHz Location:  W/cm2:    [] Paraffin         Location:   []w/heat    []  Ice     []  Heat  []  Ice massage Position: supine   Location: R knee     []  Laser  []  Other: Position:  Location:      []  Vasopneumatic Device Pressure:       [] lo [] med [] hi   Temperature:      [] Skin assessment post-treatment:  []intact []redness- no adverse reaction    []redness - adverse reaction:       40 min Therapeutic Exercise:  [x] See flow sheet :   Rationale: increase ROM and increase strength to improve the patients ability to move around without L knee pain      min Gait Training:  Began practicing heel to toe walking with the crutches as pt will be 6 weeks on Thursday and continues to be pain free with good mechanics. Rationale: increase ROM, increase strength, improve coordination and improve balance  to improve the patients ability to walk safely     With   [] TE   [] TA   [] Neuro   [] SC   [] other: Patient Education: [] Review HEP    [] Progressed/Changed HEP based on:   [] positioning   [] body mechanics   [] transfers   [] Use of heat/ice     [x] other: walking progression without brace starting on level surface (in home) then progressing to uneven surface/outdoors as tolerated         Other Objective/Functional Measures:   Performed all exercises without brace this session. Added mini tramp     ASSESSMENT/Changes in Function:   Pt was able to perform all exercises and ambulate in gym without knee brace. She was able to tolerate new exercise and increase in reps. Will continue to progress per protocol. Patient will continue to benefit from skilled PT services to modify and progress therapeutic interventions, address functional mobility deficits, address ROM deficits, address strength deficits, analyze and address soft tissue restrictions and analyze and cue movement patterns to attain remaining goals. GOALS/Progress towards goals:  Patient Goal (s): return to same activity level    []? Met []? Not met []? Partially met     Short Term Goals: To be accomplished in 12-18 treatments. 1.  Patient will demonstrate AROM R knee 0-90* without increased pain. [x] Met [] Not met [] Partially met 6/1  2. Patient will report daily compliance of progressive HEP to supplement therapy.  [x] Met [] Not met [] Partially met 6/1  3. Patient will ambulate with proper heel - toe mechanics FWB R knee. .[] Met [] Not met [] Partially met 6/21 FWB with brace     Long Term Goals: To be accomplished in 24-36 treatments. 1. Patient will demonstrate AROM R knee = L. [] Met [] Not met [] Partially met 6/21 progressing   2. Patient will ambulate up/down stairs without HR with smooth, reciprocal pattern and good eccentric control. Treatment Plan may include any combination of the following: Therapeutic exercise, Therapeutic activities, Neuromuscular re-education, Physical agent/modality, Gait/balance training, Manual therapy, Patient education and Stair training    Frequency / Duration: Patient to be seen 2-3 times per week for 24-36 treatments.     Certification Period: 5/23/2022-8/23/2022    PLAN  [x]  Continue plan of care  [x]  Upgrade activities as tolerated      [x]  Update interventions per flow sheet       []  Discharge due to:  []  Other:       Huber Fuentes, PTA 7/5/2022

## 2022-07-08 ENCOUNTER — HOSPITAL ENCOUNTER (OUTPATIENT)
Dept: PHYSICAL THERAPY | Age: 22
Discharge: HOME | End: 2022-07-08
Payer: COMMERCIAL

## 2022-07-08 PROCEDURE — 97110 THERAPEUTIC EXERCISES: CPT

## 2022-07-08 NOTE — PROGRESS NOTES
PT DAILY TREATMENT NOTE  [facility]     Patient Name: Dennise Panchal  Date:2022  : 2000  [x]  Patient  Verified  Payor: BLUE CROSS / Plan: [facility] PPO / Product Type: PPO /    Treatment Area: Pain in right knee [M25.561]  Sprain of anterior cruciate ligament of right knee, initial encounter [S83.511A]  Bucket-handle tear of lateral meniscus, current injury, right knee, initial encounter [K56.834Y]         Next MD APPT: 22  In time: 9:50am   Out time: 10:35am  Total Treatment Time (min): 45 min  Visit #:     SUBJECTIVE  Pain Level (0-10 scale) pre treatment: 0/10  Pain Level (0-10 scale) post treatment: 0/10    Any medication changes, allergies to medications, adverse drug reactions, diagnosis change, or new procedure performed?:   [x] No    [] Yes (see summary sheet for update)    Subjective functional status/changes:   [x] No changes reported       OBJECTIVE  Declined modalities]   Modality rationale: decrease pain to improve the patients ability to perform daily activities without R knee pain   Min Type Additional Details       [] Estim: []Att   []Unatt    []TENS instruct                  []IFC  []Premod   []NMES                     []Other:  []w/US   []w/ice   []w/heat  Position:  Location:       []  Traction: [] Cervical       []Lumbar                       [] Prone          []Supine                       []Intermittent   []Continuous Lbs:  [] before manual  [] after manual  []w/heat    []  Ultrasound: []Continuous   [] Pulsed                       at: []1MHz   []3MHz Location:  W/cm2:    [] Paraffin         Location:   []w/heat    []  Ice     []  Heat  []  Ice massage Position: supine   Location: R knee     []  Laser  []  Other: Position:  Location:      []  Vasopneumatic Device Pressure:       [] lo [] med [] hi   Temperature:      [] Skin assessment post-treatment:  []intact []redness- no adverse reaction    []redness - adverse reaction:       45 min Therapeutic Exercise:  [x] See flow sheet :   Rationale: increase ROM and increase strength to improve the patients ability to move around without L knee pain      min Gait Training:  Began practicing heel to toe walking with the crutches as pt will be 6 weeks on Thursday and continues to be pain free with good mechanics. Rationale: increase ROM, increase strength, improve coordination and improve balance  to improve the patients ability to walk safely     With   [] TE   [] TA   [] Neuro   [] SC   [] other: Patient Education: [] Review HEP    [] Progressed/Changed HEP based on:   [] positioning   [] body mechanics   [] transfers   [] Use of heat/ice     [x] other: walking progression without brace starting on level surface (in home) then progressing to uneven surface/outdoors as tolerated         Other Objective/Functional Measures:   Knee flexion R 113, L 117  Added step ups and hamstring NK table, progressed to BOSU ball with ball toss into rebounder, progressed to wall squats and added TB to 4 way standing SLR. ASSESSMENT/Changes in Function:   Noted leg length discrepancy with R LE shorter than L. Pt states she has noticed feeling off balance. May consider heel lift if needed as pt progresses through protocol. She is progressing very well at this time. Nearing full knee flexion ROM and progressing closed chain activities without difficulty. Patient will continue to benefit from skilled PT services to modify and progress therapeutic interventions, address functional mobility deficits, address ROM deficits, address strength deficits, analyze and address soft tissue restrictions and analyze and cue movement patterns to attain remaining goals. GOALS/Progress towards goals:  Patient Goal (s): return to same activity level    []? Met []? Not met []? Partially met     Short Term Goals: To be accomplished in 12-18 treatments. 1.  Patient will demonstrate AROM R knee 0-90* without increased pain.  [x] Met [] Not met [] Partially met 6/1  2. Patient will report daily compliance of progressive HEP to supplement therapy. [x] Met [] Not met [] Partially met  6/1  3. Patient will ambulate with proper heel - toe mechanics FWB R knee. .[x] Met [] Not met [] Partially met 7/8     Long Term Goals: To be accomplished in 24-36 treatments. 1. Patient will demonstrate AROM R knee = L. [] Met [] Not met [x] Partially met 7/7 nearing full range   2. Patient will ambulate up/down stairs without HR with smooth, reciprocal pattern and good eccentric control. [] Met [] Not met [x] Partially met  7/8 progressing     Treatment Plan may include any combination of the following: Therapeutic exercise, Therapeutic activities, Neuromuscular re-education, Physical agent/modality, Gait/balance training, Manual therapy, Patient education and Stair training    Frequency / Duration: Patient to be seen 2-3 times per week for 24-36 treatments.     Certification Period: 5/23/2022-8/23/2022    PLAN  [x]  Continue plan of care  [x]  Upgrade activities as tolerated      [x]  Update interventions per flow sheet       []  Discharge due to:  [x]  Other:  Add lunges and continue to progress proprioception and closed chain activities      Debbi Zarco, PT, DPT 7/8/2022

## 2022-07-12 ENCOUNTER — HOSPITAL ENCOUNTER (OUTPATIENT)
Dept: PHYSICAL THERAPY | Age: 22
Discharge: HOME | End: 2022-07-12
Payer: COMMERCIAL

## 2022-07-12 PROCEDURE — 97110 THERAPEUTIC EXERCISES: CPT

## 2022-07-12 NOTE — PROGRESS NOTES
PT DAILY TREATMENT NOTE  [facility]     Patient Name: Rylan Wade  Date:2022  : 2000  [x]  Patient  Verified  Payor: BLUE SHIRA / Plan: [id number] PPO / Product Type: PPO /    Treatment Area: Pain in right knee [M25.561]  Sprain of anterior cruciate ligament of right knee, initial encounter [S83.511A]  Bucket-handle tear of lateral meniscus, current injury, right knee, initial encounter [O49.140U]         Next MD APPT: 22  In time: 10:50am   Out time:11:35am  Total Treatment Time (min): 45 min  Visit #: 15/24-36    SUBJECTIVE  Pain Level (0-10 scale) pre treatment: 0/10  Pain Level (0-10 scale) post treatment: 0/10    Any medication changes, allergies to medications, adverse drug reactions, diagnosis change, or new procedure performed?:   [x] No    [] Yes (see summary sheet for update)    Subjective functional status/changes:   [x] No changes reported   Patient stated she tried to go down the stairs at home and it wasn't too bad. \"Itdoesn't hurt but I worked it out today. \"    OBJECTIVE  Declined modalities]   Modality rationale: decrease pain to improve the patients ability to perform daily activities without R knee pain   Min Type Additional Details       [] Estim: []Att   []Unatt    []TENS instruct                  []IFC  []Premod   []NMES                     []Other:  []w/US   []w/ice   []w/heat  Position:  Location:       []  Traction: [] Cervical       []Lumbar                       [] Prone          []Supine                       []Intermittent   []Continuous Lbs:  [] before manual  [] after manual  []w/heat    []  Ultrasound: []Continuous   [] Pulsed                       at: []1MHz   []3MHz Location:  W/cm2:    [] Paraffin         Location:   []w/heat    []  Ice     []  Heat  []  Ice massage Position: supine   Location: R knee     []  Laser  []  Other: Position:  Location:      []  Vasopneumatic Device Pressure:       [] lo [] med [] hi   Temperature:      [] Skin assessment post-treatment:  []intact []redness- no adverse reaction    []redness - adverse reaction:       45 min Therapeutic Exercise:  [x] See flow sheet :   Rationale: increase ROM and increase strength to improve the patients ability to move around without L knee pain      min Gait Training:  Began practicing heel to toe walking with the crutches as pt will be 6 weeks on Thursday and continues to be pain free with good mechanics. Rationale: increase ROM, increase strength, improve coordination and improve balance  to improve the patients ability to walk safely     With   [] TE   [] TA   [] Neuro   [] SC   [] other: Patient Education: [] Review HEP    [] Progressed/Changed HEP based on:   [] positioning   [] body mechanics   [] transfers   [] Use of heat/ice     [x] other: walking progression without brace starting on level surface (in home) then progressing to uneven surface/outdoors as tolerated         Other Objective/Functional Measures:   Attempted 0HHA with step ups but patient could not tolerate. Did decrease HHA with other exercises no difficulty. STR/SHR on R LE  Added mini squats on Measur[employer] ball. Added Lunges in // bars with [facility] I Patient a little apprehensive and performed mini lunge  ncreased theraband to blue and weight on NK table for hams    ASSESSMENT/Changes in Function:   Patient progressing well with exercises and able to increase resistance on theraband. She was also able to tolerate new exercises with no complaints of pain but did have some apprehension. She was not able to [facility] to [facility] step [facility]. No pain post treatment session. Patient will continue to benefit from skilled PT services to modify and progress therapeutic interventions, address functional mobility deficits, address ROM deficits, address strength deficits, analyze and address soft tissue restrictions and analyze and cue movement patterns to attain remaining goals.         GOALS/Progress towards goals:  Patient Goal (s): return to same activity level    []? Met []? Not met []? Partially met     Short Term Goals: To be accomplished in 12-18 treatments. 1.  Patient will demonstrate AROM R knee 0-90* without increased pain. [x] Met [] Not met [] Partially met 6/1  2. Patient will report daily compliance of progressive HEP to supplement therapy. [x] Met [] Not met [] Partially met  6/1  3. Patient will ambulate with proper heel - toe mechanics FWB R knee. .[x] Met [] Not met [] Partially met 7/8     Long Term Goals: To be accomplished in 24-36 treatments. 1. Patient will demonstrate AROM R knee = L. [] Met [] Not met [x] Partially met 7/7 nearing full range   2. Patient will ambulate up/down stairs without HR with smooth, reciprocal pattern and good eccentric control. [] Met [] Not met [x] Partially met  7/8 progressing     Treatment Plan may include any combination of the following: Therapeutic exercise, Therapeutic activities, Neuromuscular re-education, Physical agent/modality, Gait/balance training, Manual therapy, Patient education and Stair training    Frequency / Duration: Patient to be seen 2-3 times per week for 24-36 treatments.     Certification Period: 5/23/2022-8/23/2022    PLAN  [x]  Continue plan of care  [x]  Upgrade activities as tolerated      [x]  Update interventions per flow sheet       []  Discharge due to:  []  Other:  Reassess next visit      Magalys Skinner, PTA 7/12/2022

## 2022-07-15 ENCOUNTER — HOSPITAL ENCOUNTER (OUTPATIENT)
Dept: PHYSICAL THERAPY | Age: 22
Discharge: HOME | End: 2022-07-15
Payer: COMMERCIAL

## 2022-07-15 PROCEDURE — 97110 THERAPEUTIC EXERCISES: CPT

## 2022-07-15 NOTE — PROGRESS NOTES
[address]  Ph: [phone]    Fax: [phone]  Therapy Progress Report  Name: Tressa Johnson  : 2000   MD: Brittany Cerna MD     Medical Diagnosis: Pain in right knee [M25.561]  Sprain of anterior cruciate ligament of right knee, initial encounter [A00.124O]  Bucket-handle tear of lateral meniscus, current injury, right knee, initial encounter Lilli Caery  Start of Care: 7/15/22 Visits from Start of Care: 16    Missed Visits: 0  Certification Period: 22 - 22    Frequency/Duration: 2 times a week for 24-36 treatments      Summary of Care: Pt is progressing with PT services. AROM/PROM now equal bilaterally. She demos normalized gait pattern without AD and no pain. She is tolerating closed chain exercises per protocol. Pt reports she is most limited with steps (reciprocal pattern) and performing mini-squats with equal weight shift. Patient will continue to benefit from skilled PT services to modify and progress therapeutic interventions, address functional mobility deficits, address ROM deficits, address strength deficits, analyze and address soft tissue restrictions and analyze and cue movement patterns to attain remaining goals. GOALS/Progress towards goals:  Patient Goal (s): return to same activity level    []? ? Met []? ? Not met []? ? Partially met   Short Term Goals: To be accomplished in 12-18 treatments. 1.  Patient will demonstrate AROM R knee 0-90* without increased pain. [x]? Met []? Not met []? Partially met   2.  Patient will report daily compliance of progressive HEP to supplement therapy. [x]? Met []? Not met []? Partially met    3.  Patient will ambulate with proper heel - toe mechanics FWB R knee.  .[x]? Met []? Not met []? Partially met    Long Term Goals: To be accomplished in -36 treatments. 1. Patient will demonstrate AROM R knee = L. [x]? Met []? Not met []? Partially met 7/15   2.  Patient will ambulate up/down stairs without HR with smooth, reciprocal pattern and good eccentric control. []? Met [x]? Not met []? Partially met  7/15 unable to do reciprocal pattern without handrail, difficulty    Knee                                 AROM                          PROM                           MMT    R L R L R L   Extension (0)      10 15 4 5   Flexion (145) 115 115 120   4+ 5   Additional comments: prone knee flexion 106 deg bilaterally     Hip                                AROM                            PROM                              MMT    R L R L R L   Flexion (120)         4 5   Extension (15)         4- 5   Abduction (40)         5 5   Adduction (30)         5 5   IR (40)               ER (40)               Additional comments: slight weakness R hip    Ampac Score:  35.72% (improved from 54%)  Recommendations: Continue PT per protocol. [x]  Plan of care has been reviewed with PTA. Jaylene Izaguirre, PT, DPT 7/15/2022     ________________________________________________________________________     Please retain this original for your records.

## 2022-07-15 NOTE — PROGRESS NOTES
PT DAILY TREATMENT NOTE  [facility]     Patient Name: Renu Fuentes  Date:7/15/2022  : 2000  [x]  Patient  Verified  Payor: BLUE SHIRA / Plan: [id number] PPO / Product Type: PPO /    Treatment Area: Pain in right knee [M25.561]  Sprain of anterior cruciate ligament of right knee, initial encounter [S83.511A]  Bucket-handle tear of lateral meniscus, current injury, right knee, initial encounter [S20.616N]         Next MD APPT: 22  In time: 100pm  Out time: 0148pm  Total Treatment Time (min): 48  Visit #:     SUBJECTIVE  Pain Level (0-10 scale) pre treatment: 0/10  Pain Level (0-10 scale) post treatment: 0/10    Any medication changes, allergies to medications, adverse drug reactions, diagnosis change, or new procedure performed?:   [x] No    [] Yes (see summary sheet for update)    Subjective functional status/changes:   [x] No changes reported   Pt reports total of 50% improvement. She has difficulty with stairs and weightbearing on the R side    OBJECTIVE    48 min Therapeutic Exercise:  [x] See flow sheet : reassessment performed   Rationale: increase ROM and increase strength to improve the patients ability to move around without L knee pain      min Gait Training:  Began practicing heel to toe walking with the crutches as pt will be 6 weeks on Thursday and continues to be pain free with good mechanics.      Rationale: increase ROM, increase strength, improve coordination and improve balance  to improve the patients ability to walk safely     With   [] TE   [] TA   [] Neuro   [] SC   [] other: Patient Education: [] Review HEP    [] Progressed/Changed HEP based on:   [] positioning   [] body mechanics   [] transfers   [] Use of heat/ice     [x] other: walking progression without brace starting on level surface (in home) then progressing to uneven surface/outdoors as tolerated         Other Objective/Functional Measures:     Knee          AROM          PROM MMT   R L R L R L   Extension (0)    10 15 4+ 5   Flexion (145) 115 115 120  5 5   Additional comments: prone knee flexion 106 deg bilaterally     Hip      AROM       PROM             MMT   R L R L R L   Flexion (120)     4 5   Extension (15)     4 5   Abduction (40)     5 5   Adduction (30)     5 5   IR (40)         ER (40)         Additional comments: slight weakness R hip      ASSESSMENT/Changes in Function:   Pt is progressing with PT services. AROM/PROM now equal bilaterally. She demos normalized gait pattern without AD and no pain. She is tolerating closed chain exercises per protocol. Pt reports she is most limited with steps (reciprocal pattern) and performing mini-squats with equal weight shift. Patient will continue to benefit from skilled PT services to modify and progress therapeutic interventions, address functional mobility deficits, address ROM deficits, address strength deficits, analyze and address soft tissue restrictions and analyze and cue movement patterns to attain remaining goals. GOALS/Progress towards goals:  Patient Goal (s): return to same activity level    []? Met []? Not met []? Partially met   Short Term Goals: To be accomplished in 12-18 treatments. 1.  Patient will demonstrate AROM R knee 0-90* without increased pain. [x] Met [] Not met [] Partially met 6/1  2. Patient will report daily compliance of progressive HEP to supplement therapy. [x] Met [] Not met [] Partially met  6/1  3. Patient will ambulate with proper heel - toe mechanics FWB R knee. .[x] Met [] Not met [] Partially met 7/8   Long Term Goals: To be accomplished in 24-36 treatments. 1. Patient will demonstrate AROM R knee = L. [x] Met [] Not met [] Partially met 7/15   2. Patient will ambulate up/down stairs without HR with smooth, reciprocal pattern and good eccentric control.  [] Met [x] Not met [] Partially met  7/15 unable to do reciprocal pattern without handrail, difficulty     Treatment Plan may include any combination of the following: Therapeutic exercise, Therapeutic activities, Neuromuscular re-education, Physical agent/modality, Gait/balance training, Manual therapy, Patient education and Stair training    Frequency / Duration: Patient to be seen 2-3 times per week for 24-36 treatments.     Certification Period: 5/23/2022-8/23/2022    PLAN  [x]  Continue plan of care  [x]  Upgrade activities as tolerated      [x]  Update interventions per flow sheet       []  Discharge due to:  []  Other:        Lurdes Helms, PT, DPT 7/15/2022

## 2022-07-19 ENCOUNTER — HOSPITAL ENCOUNTER (OUTPATIENT)
Dept: PHYSICAL THERAPY | Age: 22
Discharge: HOME | End: 2022-07-19
Payer: COMMERCIAL

## 2022-07-19 PROCEDURE — 97110 THERAPEUTIC EXERCISES: CPT

## 2022-07-19 NOTE — PROGRESS NOTES
PT DAILY TREATMENT NOTE  [facility]     Patient Name: Nanette Fernandez  Date:2022  : 2000  [x]  Patient  Verified  Payor: BLUE [employer]IRA / Plan: [id number] PPO / Product Type: PPO /    Treatment Area: Pain in right knee [M25.561]  Sprain of anterior cruciate ligament of right knee, initial encounter [S83.511A]  Bucket-handle tear of lateral meniscus, current injury, right knee, initial encounter [R00.236I]         Next MD APPT: 22  In time: 1:46pm  Out time: 2:36pm  Total Treatment Time (min): 50  Visit #:     SUBJECTIVE  Pain Level (0-10 scale) pre treatment: 0/10  Pain Level (0-10 scale) post treatment: 0/10    Any medication changes, allergies to medications, adverse drug reactions, diagnosis change, or new procedure performed?:   [x] No    [] Yes (see summary sheet for update)    Subjective functional status/changes:   [x] No changes reported   Pt states she feels like she is getting stronger. Gaining more confidence in the leg. OBJECTIVE    50 min Therapeutic Exercise:  [x] See flow sheet :   Rationale: increase ROM and increase strength to improve the patients ability to move around without L knee pain     With   [x] TE   [] TA   [] Neuro   [] SC   [] other: Patient Education: [x] Review HEP    [x] Progressed/Changed HEP based on: work on depth of lunge   [] positioning   [] body mechanics   [] transfers   [] Use of heat/ice     [] other:         Other Objective/Functional Measures: continued to progress closed chain activities. Added tandem stance with EO/EC and side lunges. Decreased height on step ups but also decreased UE support. Added reverse step up to the terminal knee ext. ASSESSMENT/Changes in Function:   Pt is progressing well at this time. Did well with proprioceptive exercises and was able to progress closed chain exercises. Pt with mild discomfort along the lateral scar during lunges but otherwise pain free. Continue per protocol.     Patient will continue to benefit from skilled PT services to modify and progress therapeutic interventions, address functional mobility deficits, address ROM deficits, address strength deficits, analyze and address soft tissue restrictions and analyze and cue movement patterns to attain remaining goals. GOALS/Progress towards goals:  Patient Goal (s): return to same activity level    []? Met []? Not met []? Partially met      Short Term Goals: To be accomplished in 12-18 treatments. 1.  Patient will demonstrate AROM R knee 0-90* without increased pain. [x] Met [] Not met [] Partially met 6/1  2. Patient will report daily compliance of progressive HEP to supplement therapy. [x] Met [] Not met [] Partially met  6/1  3. Patient will ambulate with proper heel - toe mechanics FWB R knee. .[x] Met [] Not met [] Partially met 7/8     Long Term Goals: To be accomplished in 24-36 treatments. 1. Patient will demonstrate AROM R knee = L. [x] Met [] Not met [] Partially met 7/15   2. Patient will ambulate up/down stairs without HR with smooth, reciprocal pattern and good eccentric control. [] Met [x] Not met [] Partially met  7/15 unable to do reciprocal pattern without handrail, difficulty     Treatment Plan may include any combination of the following: Therapeutic exercise, Therapeutic activities, Neuromuscular re-education, Physical agent/modality, Gait/balance training, Manual therapy, Patient education and Stair training    Frequency / Duration: Patient to be seen 2-3 times per week for 24-36 treatments.     Certification Period: 5/23/2022-8/23/2022    PLAN  [x]  Continue plan of care  [x]  Upgrade activities as tolerated      [x]  Update interventions per flow sheet       []  Discharge due to:  []  Other:        Ajay Liu, PT, DPT 7/19/2022

## 2022-07-22 ENCOUNTER — APPOINTMENT (OUTPATIENT)
Dept: PHYSICAL THERAPY | Age: 22
End: 2022-07-22
Payer: COMMERCIAL

## 2022-07-26 ENCOUNTER — APPOINTMENT (OUTPATIENT)
Dept: PHYSICAL THERAPY | Age: 22
End: 2022-07-26
Payer: COMMERCIAL

## 2022-07-29 ENCOUNTER — HOSPITAL ENCOUNTER (OUTPATIENT)
Dept: PHYSICAL THERAPY | Age: 22
Discharge: HOME | End: 2022-07-29
Payer: COMMERCIAL

## 2022-07-29 PROCEDURE — 97110 THERAPEUTIC EXERCISES: CPT

## 2022-07-29 NOTE — PROGRESS NOTES
PT DAILY TREATMENT NOTE  NON [facility]     Patient Name: Shannon Orlando  Date:2022  : 2000  [x]  Patient  Verified  Payor: BLUE CROSS / Plan: [employer]ios 5747 PPO / Product Type: PPO /    Treatment Area: Pain in right knee [M25.561]  Sprain of anterior cruciate ligament of right knee, initial encounter [S83.511A]  Bucket-handle tear of lateral meniscus, current injury, right knee, initial encounter [W90.111A]         Next MD APPT: 22  In time: 1355  Out time: 1440  Total Treatment Time (min): 45  Visit #:     SUBJECTIVE  Pain Level (0-10 scale) pre treatment: 0/10  Pain Level (0-10 scale) post treatment: 0/10    Any medication changes, allergies to medications, adverse drug reactions, diagnosis change, or new procedure performed?:   [x] No    [] Yes (see summary sheet for update)    Subjective functional status/changes:   [x] No changes reported   Pt reports she went to the Dr on Tuesday and was told she was on the right track and to focus on strengthening. The Dr also told her she could start running/jogging but the pt is hesitant     OBJECTIVE    45 min Therapeutic Exercise:  [x] See flow sheet :   Rationale: increase ROM and increase strength to improve the patients ability to move around without L knee pain     With   [x] TE   [] TA   [] Neuro   [] SC   [] other: Patient Education: [x] Review HEP    [x] Progressed/Changed HEP based on: work on depth of lunge   [] positioning   [] body mechanics   [] transfers   [] Use of heat/ice     [] other:         Other Objective/Functional Measures:  Continue with ex per flow sheet , tried gentle jog on mini tramp and pt reported \"it felt weird\"  no pain  Instructed pt to start walking gradually increasing distance and speed as tolerated before she tries to jog. ASSESSMENT/Changes in Function:     Pt is progressing well. Is somewhat reluctant to try jogging, not trusting the R knee. Mild jog on mini tramp was tolerated.     Patient will continue to benefit from skilled PT services to modify and progress therapeutic interventions, address functional mobility deficits, address ROM deficits, address strength deficits, analyze and address soft tissue restrictions and analyze and cue movement patterns to attain remaining goals. GOALS/Progress towards goals:  Patient Goal (s): return to same activity level    [] Met [] Not met [] Partially met      Short Term Goals: To be accomplished in 12-18 treatments. 1.  Patient will demonstrate AROM R knee 0-90* without increased pain. [x] Met [] Not met [] Partially met 6/1  2. Patient will report daily compliance of progressive HEP to supplement therapy. [x] Met [] Not met [] Partially met  6/1  3. Patient will ambulate with proper heel - toe mechanics FWB R knee. .[x] Met [] Not met [] Partially met 7/8     Long Term Goals: To be accomplished in 24-36 treatments. 1. Patient will demonstrate AROM R knee = L. [x] Met [] Not met [] Partially met 7/15   2. Patient will ambulate up/down stairs without HR with smooth, reciprocal pattern and good eccentric control. [] Met [x] Not met [] Partially met  7/15 unable to do reciprocal pattern without handrail, difficulty     Treatment Plan may include any combination of the following: Therapeutic exercise, Therapeutic activities, Neuromuscular re-education, Physical agent/modality, Gait/balance training, Manual therapy, Patient education and Stair training    Frequency / Duration: Patient to be seen 2-3 times per week for 24-36 treatments.     Certification Period: 5/23/2022-8/23/2022    PLAN  [x]  Continue plan of care  [x]  Upgrade activities as tolerated      [x]  Update interventions per flow sheet       []  Discharge due to:  []  Other:        Angel Miguel, PT 7/29/2022

## 2022-08-05 ENCOUNTER — HOSPITAL ENCOUNTER (OUTPATIENT)
Dept: PHYSICAL THERAPY | Age: 22
Discharge: HOME | End: 2022-08-05
Payer: COMMERCIAL

## 2022-08-05 PROCEDURE — 97110 THERAPEUTIC EXERCISES: CPT

## 2022-08-05 NOTE — PROGRESS NOTES
PT DAILY TREATMENT NOTE  [facility]     Patient Name: Timbo Mota  Date:2022  : 2000  [x]  Patient  Verified  Payor: BLUE SHIRA / Plan: [employer]ios 5747 PPO / Product Type: PPO /    Treatment Area: Pain in right knee [M25.561]  Sprain of anterior cruciate ligament of right knee, initial encounter [S83.511A]  Bucket-handle tear of lateral meniscus, current injury, right knee, initial encounter [H35.224Q]         Next MD APPT: 22  In time: 9:49 am  Out time: 10:35 am   Total Treatment Time (min): 46   Visit #:     SUBJECTIVE  Pain Level (0-10 scale) pre treatment: 0/10  Pain Level (0-10 scale) post treatment: 0/10    Any medication changes, allergies to medications, adverse drug reactions, diagnosis change, or new procedure performed?:   [x] No    [] Yes (see summary sheet for update)    Subjective functional status/changes:   [x] No changes reported   Pt reports no major changes since last visit. Expressed desire to start going on walks. Is still hesitant to try jogging but stated she would try jumping next session. Pt will have one more visit before returning to school and will be working with  there. Will see doctor again at 6 months post op. OBJECTIVE    46 min Therapeutic Exercise:  [x] See flow sheet :   Rationale: increase ROM and increase strength to improve the patients ability to move around without L knee pain     With   [x] TE   [] TA   [] Neuro   [] SC   [] other: Patient Education: [x] Review HEP    [x] Progressed/Changed HEP based on: work on depth of lunge   [] positioning   [] body mechanics   [] transfers   [] Use of heat/ice     [x] other: Encouraged patient to start going on walks         Other Objective/Functional Measures:     Added 9 minutes on treadmill at 1.9 mph   Increased weight with leg press   Added bouncing on trampoline to progress to jumping, SL Squat with UE support, and SL balance with foam and SL [employer] toss    ASSESSMENT/Changes in Function:   Pt was able to progress both strengthening and proprioception exercises. Will check in on post session soreness next time. Pt is willing to try new/more advanced exercises but does show some hesitation and does not totally trust R knee stability. Has difficulty with lunge form. Needed cues to let back heel rise in order to increase depth. Still limited depth on DL squat. Now able to squat half of BW on leg press. Continue to focus on SL strength, calf endurance, and dynamic balance in order to allow for progression back to sport. Will start to work on bilateral hopping on [employer] next time. Instructed patient to keep working on lunges at home to improve form, strengthen both LE's, and decrease reliance on UE support. Encouraged pt to start going on walks at a quicker walking pace. Patient will continue to benefit from skilled PT services to modify and progress therapeutic interventions, address functional mobility deficits, address ROM deficits, address strength deficits, analyze and address soft tissue restrictions and analyze and cue movement patterns to attain remaining goals. GOALS/Progress towards goals:  Patient Goal (s): return to same activity level    [] Met [] Not met [] Partially met      Short Term Goals: To be accomplished in 12-18 treatments. 1.  Patient will demonstrate AROM R knee 0-90* without increased pain. [x] Met [] Not met [] Partially met 6/1  2. Patient will report daily compliance of progressive HEP to supplement therapy. [x] Met [] Not met [] Partially met  6/1  3. Patient will ambulate with proper heel - toe mechanics FWB R knee. .[x] Met [] Not met [] Partially met 7/8     Long Term Goals: To be accomplished in 24-36 treatments. 1. Patient will demonstrate AROM R knee = L. [x] Met [] Not met [] Partially met 7/15   2. Patient will ambulate up/down stairs without HR with smooth, reciprocal pattern and good eccentric control.  [] Met [x] Not met [] Partially met  7/15 unable to do reciprocal pattern without handrail, difficulty     Treatment Plan may include any combination of the following: Therapeutic exercise, Therapeutic activities, Neuromuscular re-education, Physical agent/modality, Gait/balance training, Manual therapy, Patient education and Stair training    Frequency / Duration: Patient to be seen 2-3 times per week for 24-36 treatments. Certification Period: 5/23/2022-8/23/2022    PLAN  [x]  Continue plan of care  [x]  Upgrade activities as tolerated      [x]  Update interventions per flow sheet       []  Discharge due to:  [x]  Other:  Bilateral hopping on trampoline, Continued SL strengthening (12 weeks post op)     Patient was informed and agreed to allow student to observe and participate in medical care. Patient was seen by student and licensed therapist who is in agreement with the above documentation.     Jordyn Shah, SPT 8/5/2022

## 2022-08-11 ENCOUNTER — HOSPITAL ENCOUNTER (OUTPATIENT)
Dept: PHYSICAL THERAPY | Age: 22
Discharge: HOME | End: 2022-08-11
Payer: COMMERCIAL

## 2022-08-11 PROCEDURE — 97110 THERAPEUTIC EXERCISES: CPT

## 2022-08-11 NOTE — PROGRESS NOTES
PT DAILY TREATMENT NOTE  [facility]     Patient Name: Yuval Taylor  Date:2022  : 2000  [x]  Patient  Verified  Payor: BLUE CROSS / Plan: [facility] PPO / Product Type: PPO /    Treatment Area: Pain in right knee [M25.561]  Sprain of anterior cruciate ligament of right knee, initial encounter [S83.511A]  Bucket-handle tear of lateral meniscus, current injury, right knee, initial encounter [V24.916N]         Next MD APPT: 22  In time: 2:20pm Out time: 3:06pm  Total Treatment Time (min): 46  Visit #:     SUBJECTIVE  Pain Level (0-10 scale) pre treatment: 0/10  Pain Level (0-10 scale) post treatment: 0/10    Any medication changes, allergies to medications, adverse drug reactions, diagnosis change, or new procedure performed?:   [x] No    [] Yes (see summary sheet for update)    Subjective functional status/changes:   [] No changes reported   Max R shoulder pain 310. Pt reports no difficulty with daily activity. Pt reports 70% improvement since starting therapy. She will be heading back to school this weekend and will begin working with the . Goal: return to dance.       OBJECTIVE    46 min Therapeutic Exercise:  [x] See flow sheet : reassessment    Rationale: increase ROM and increase strength to improve the patients ability to move around without L knee pain     With   [x] TE   [] TA   [] Neuro   [] SC   [] other: Patient Education: [x] Review HEP    [] Progressed/Changed HEP based on: work on depth of lunge   [] positioning   [] body mechanics   [] transfers   [x] Use of heat/ice     [] other: Encouraged patient to start going on walks         Other Objective/Functional Measures:    Knee                                 AROM                          PROM                           MMT    R L R L R L   Extension (0)  12 12   5 5   Flexion (145) 120 116    5 5   Additional comments:      Hip                                AROM PROM                              MMT    R L R L R L   Flexion (120)         5 5   Extension (15)         5 5   Abduction (40)         5 5   Adduction (30)         5 5   IR (40)               ER (40)               Additional comments:     Stairs: reciprocal without HR, weakness on ascent, no difficulty on descent    Balance:  Tandem stance: EO WNL cruz - >30s, EC WNL cruz - >30s   SLS: EO R>30ss L26s, EC 3s on each limb     Core strength - Slight abdominal weakness noted - good extensor strength     Functional mobility testing:    Heel/toe walking- able to perform with some weakness felt on R during heel walking   30s Timed stands - 14 reps, no difficulty   SL squat L 48 deg, R 41 deg with slight anterior knee pain   SL leg press (25% body weight) max rep in 30s: L 10reps, unable on R        Ampac Score:  32.19%      ASSESSMENT/Changes in Function:   Pt is 14 weeks post op and progressing very well at this time. Pt has made great gains in R knee ROM and strength and is meeting markers per protocol. She does still have some functional quad weakness and apprehension with single limb activities. Her goal is to return to dance unrestricted. She has been discharged from our clinic as she will be returning to school this Saturday. Recommend continuation with an  for full return to PLOF. GOALS/Progress towards goals:  Patient Goal (s): return to same activity level    [] Met [] Not met [x] Partially met  - 70% improved     Short Term Goals: To be accomplished in 12-18 treatments. 1.  Patient will demonstrate AROM R knee 0-90* without increased pain. [x] Met [] Not met [] Partially met 6/1  2. Patient will report daily compliance of progressive HEP to supplement therapy. [x] Met [] Not met [] Partially met  6/1  3. Patient will ambulate with proper heel - toe mechanics FWB R knee. .[x] Met [] Not met [] Partially met 7/8     Long Term Goals: To be accomplished in 24-36 treatments.   1. Patient will demonstrate AROM R knee = L. [x] Met [] Not met [] Partially met 7/15   2. Patient will ambulate up/down stairs without HR with smooth, reciprocal pattern and good eccentric control. [x] Met [] Not met [] Partially met  8/11    Treatment Plan may include any combination of the following: Therapeutic exercise, Therapeutic activities, Neuromuscular re-education, Physical agent/modality, Gait/balance training, Manual therapy, Patient education and Stair training    Frequency / Duration: Patient to be seen 2-3 times per week for 24-36 treatments.     Certification Period: 5/23/2022-8/23/2022    PLAN  []  Continue plan of care  []  Upgrade activities as tolerated      []  Update interventions per flow sheet       [x]  Discharge due to: returning to school   []  Other:       Dwaine Sanderson, PT, DPT 8/11/2022

## 2022-08-11 NOTE — PROGRESS NOTES
[address]  Ph: [phone]  Fax: [phone]    Discharge Summary 2-15    Patient name: Marisela Bartlett  : 2000  Provider#: [id number]  Referral source: Madison Persaud MD      Medical/Treatment Diagnosis: Pain in right knee [M25.561]  Sprain of anterior cruciate ligament of right knee, initial encounter [S83.400A]  Bucket-handle tear of lateral meniscus, current injury, right knee, initial encounter Orene Lizzy     Prior Hospitalization: see medical history     Comorbidities: See Plan of Care  Prior Level of Function: See Plan of Care  Medications: Verified on Patient Summary List  Start of Care: 22   Onset Date: (see initial plan of care)  Visits from Start of Care: 20  Missed Visits: 0  Certification Period : 22 to 22      ASSESSMENT/Changes in Function:   Pt is 14 weeks post op and progressing very well at this time. Pt has made great gains in R knee ROM and strength and is meeting markers per protocol. She does still have some functional quad weakness and apprehension with single limb activities. Her goal is to return to dance unrestricted. She has been discharged from our clinic as she will be returning to school this Saturday. Recommend continuation with an  for full return to PLOF. GOALS/Progress towards goals:  Patient Goal (s): return to same activity level    [] Met [] Not met [x] Partially met  - 70% improved      Short Term Goals: To be accomplished in 12-18 treatments. 1.  Patient will demonstrate AROM R knee 0-90* without increased pain. [x] Met [] Not met [] Partially met   2. Patient will report daily compliance of progressive HEP to supplement therapy. [x] Met [] Not met [] Partially met    3. Patient will ambulate with proper heel - toe mechanics FWB R knee. .[x] Met [] Not met [] Partially met      Long Term Goals:  To be accomplished in 24-36 treatments. 1. Patient will demonstrate AROM R knee = L. [x] Met [] Not met [] Partially met 7/15  2. Patient will ambulate up/down stairs without HR with smooth, reciprocal pattern and good eccentric control. [x] Met [] Not met [] Partially met  8/11    ______________________________________    Assessment/Summary of care/GOALS:   SUBJECTIVE  Pain Level (0-10 scale) pre treatment: 0/10  Pain Level (0-10 scale) post treatment: 4/10 soreness      Any medication changes, allergies to medications, adverse drug reactions, diagnosis change, or new procedure performed?:   [x] No    [] Yes (see summary sheet for update)     Subjective functional status/changes:   [] No changes reported  Max R shoulder pain 3/10. Pt reports no difficulty with daily activity. Pt reports 70% improvement since starting therapy. She will be heading back to school this weekend and will begin working with the . Goal: return to dance.        OBJECTIVE    Other Objective/Functional Measures:    Knee                                 AROM                          PROM                           MMT    R L R L R L   Extension (0)  12 12     5 5   Flexion (145) 120 116     5 5   Additional comments:      Hip                                AROM                            PROM                              MMT    R L R L R L   Flexion (120)         5 5   Extension (15)         5 5   Abduction (40)         5 5   Adduction (30)         5 5   IR (40)               ER (40)               Additional comments:      Stairs: reciprocal without HR, weakness on ascent, no difficulty on descent     Balance:  Tandem stance: EO WNL cruz - >30s, EC WNL cruz - >30s  SLS: EO R>30ss L26s, EC 3s on each limb     Core strength - Slight abdominal weakness noted - good extensor strength     Functional mobility testing:    Heel/toe walking- able to perform with some weakness felt on R during heel walking  30s Timed stands - 14 reps, no difficulty  SL squat L 48 deg, R 41 deg with slight anterior knee pain  SL leg press (25% body weight) max rep in 30s: L 10reps, unable on R         [facility] Score:  32.19%  RECOMMENDATIONS:  [x]Discontinue therapy:   [x]Patient has reached or is progressing toward set goals and is independent with HEP   []Patient is non-compliant or has abdicated   []Due to lack of appreciable progress towards set goals   []Patient was hospitalized or suffered illness that impacted ability to continue therapy   [x]Other: returning to school   [address], PT, DPT 8/11/2022